# Patient Record
Sex: MALE | Race: WHITE | NOT HISPANIC OR LATINO | Employment: FULL TIME | URBAN - METROPOLITAN AREA
[De-identification: names, ages, dates, MRNs, and addresses within clinical notes are randomized per-mention and may not be internally consistent; named-entity substitution may affect disease eponyms.]

---

## 2017-07-21 ENCOUNTER — HOSPITAL ENCOUNTER (OUTPATIENT)
Dept: OTHER | Facility: HOSPITAL | Age: 32
Discharge: HOME | End: 2017-07-21
Attending: PREVENTIVE MEDICINE | Admitting: PREVENTIVE MEDICINE

## 2019-08-09 ENCOUNTER — APPOINTMENT (OUTPATIENT)
Dept: GENERAL RADIOLOGY | Facility: HOSPITAL | Age: 34
End: 2019-08-09

## 2019-08-09 ENCOUNTER — HOSPITAL ENCOUNTER (EMERGENCY)
Facility: HOSPITAL | Age: 34
Discharge: HOME | End: 2019-08-09
Admitting: EMERGENCY MEDICINE

## 2019-08-09 VITALS
DIASTOLIC BLOOD PRESSURE: 90 MMHG | WEIGHT: 228.18 LBS | OXYGEN SATURATION: 99 % | HEART RATE: 84 BPM | HEIGHT: 72 IN | RESPIRATION RATE: 16 BRPM | BODY MASS INDEX: 30.91 KG/M2 | TEMPERATURE: 98 F | SYSTOLIC BLOOD PRESSURE: 145 MMHG

## 2019-08-09 DIAGNOSIS — L02.415 ABSCESS OF RIGHT LOWER EXTREMITY: Primary | ICD-10-CM

## 2019-08-09 PROCEDURE — 73502 X-RAY EXAM HIP UNI 2-3 VIEWS: CPT

## 2019-08-09 PROCEDURE — 87205 SMEAR GRAM STAIN: CPT | Performed by: PHYSICIAN ASSISTANT

## 2019-08-09 PROCEDURE — 87147 CULTURE TYPE IMMUNOLOGIC: CPT | Performed by: PHYSICIAN ASSISTANT

## 2019-08-09 PROCEDURE — 99283 EMERGENCY DEPT VISIT LOW MDM: CPT

## 2019-08-09 PROCEDURE — 87070 CULTURE OTHR SPECIMN AEROBIC: CPT | Performed by: PHYSICIAN ASSISTANT

## 2019-08-09 PROCEDURE — 87186 SC STD MICRODIL/AGAR DIL: CPT | Performed by: PHYSICIAN ASSISTANT

## 2019-08-09 RX ORDER — IBUPROFEN 400 MG/1
800 TABLET ORAL ONCE
Status: COMPLETED | OUTPATIENT
Start: 2019-08-09 | End: 2019-08-09

## 2019-08-09 RX ORDER — SULFAMETHOXAZOLE AND TRIMETHOPRIM 800; 160 MG/1; MG/1
1 TABLET ORAL 2 TIMES DAILY
Qty: 20 TABLET | Refills: 0 | Status: SHIPPED | OUTPATIENT
Start: 2019-08-09

## 2019-08-09 RX ORDER — CEPHALEXIN 500 MG/1
500 CAPSULE ORAL 3 TIMES DAILY
Qty: 30 CAPSULE | Refills: 0 | Status: SHIPPED | OUTPATIENT
Start: 2019-08-09

## 2019-08-09 RX ORDER — IBUPROFEN 800 MG/1
800 TABLET ORAL EVERY 6 HOURS PRN
Qty: 30 TABLET | Refills: 0 | Status: SHIPPED | OUTPATIENT
Start: 2019-08-09

## 2019-08-09 RX ADMIN — IBUPROFEN 800 MG: 400 TABLET ORAL at 19:24

## 2019-08-09 NOTE — ED PROVIDER NOTES
Subjective   History of Present Illness  Patient is a healthy 34-year-old male who presents with an abscess on his right hip that he noticed 2 weeks ago.  Patient states his been putting antibiotic ointment on it with no relief of the symptoms.  He describes localized tenderness with palpation.  He also reports some purulent drainage over the past 2 days.  He denies fever, body aches or chills, nausea or vomiting, chest pain, shortness of breath  Review of Systems   Constitutional: Negative.    Respiratory: Negative.    Cardiovascular: Negative.    Gastrointestinal: Negative for abdominal pain, nausea and vomiting.   Musculoskeletal: Negative for gait problem and joint swelling.   Skin: Positive for wound.   Neurological: Negative for dizziness, seizures, weakness and headaches.       No past medical history on file.    No Known Allergies    No past surgical history on file.    No family history on file.    Social History     Socioeconomic History   • Marital status: Significant Other     Spouse name: Not on file   • Number of children: Not on file   • Years of education: Not on file   • Highest education level: Not on file           Objective   Physical Exam   Nursing note and vitals reviewed.    The patient is well developed, well nourished, alert, cooperative and in no acute distress.    HEENT exam is normocephalic atraumatic. Pupils are equal round and reactive to light. EOMI.  Mucous membranes are moist.     Lungs are clear to auscultation bilaterally chest wall expansion equal without retraction    Cardiovascular:  Regular rate and rhythm without murmur     Extremities:  There is no significant deformity or joint abnormality.  No edema.  Peripheral pulses are intact.  Normal range of motion of right hip.     Neurologic:  Alert and oriented without focal neurological deficits.     Skin shows 3 cm x 2 cm purulent abscess with surrounding induration and erythema noted.  No fluctuance noted. Otherwise skin shows  "no rash, petechiae, or purpura.      Incision & Drainage  Date/Time: 8/9/2019 7:20 PM  Performed by: Erickson Polanco PA  Authorized by: Erickson Polanco PA     Consent:     Consent obtained:  Verbal    Consent given by:  Patient    Risks discussed:  Incomplete drainage, bleeding, pain and infection    Alternatives discussed:  No treatment and delayed treatment  Location:     Type:  Abscess    Size:  [id number]     Location:  Lower extremity    Lower extremity location:  Leg    Leg location:  R upper leg  Pre-procedure details:     Skin preparation:  Betadine  Anesthesia (see MAR for exact dosages):     Anesthesia method:  Local infiltration    Local anesthetic:  Lidocaine 2% w/o epi  Procedure type:     Complexity:  Complex  Procedure details:     Needle aspiration: no      Incision types:  Stab incision    Incision depth:  Subcutaneous    Scalpel blade:  11    Wound management:  Probed and deloculated    Drainage:  Purulent and bloody    Drainage amount:  Copious    Packing materials:  1/2 in iodoform gauze  Post-procedure details:     Patient tolerance of procedure:  Tolerated well, no immediate complications               ED Course    /83 (BP Location: Right arm, Patient Position: Sitting)   Pulse 93   Temp 98.4 °F (36.9 °C) (Oral)   Resp 19   Ht 182.9 cm (72\")   Wt 104 kg (228 lb 2.8 oz)   SpO2 98%   BMI 30.95 kg/m²   Medications   ibuprofen (ADVIL,MOTRIN) tablet 800 mg (not administered)     Xr Hip With Or Without Pelvis 2 - 3 View Right    Result Date: 8/9/2019  Normal exam.  Electronically Signed By-Jessie Pederson On:8/9/2019 6:11 PM This report was finalized on [id number] by  Jessie Pederson, .    Labs Reviewed   WOUND CULTURE                    MDM  Number of Diagnoses or Management Options  Abscess of right lower extremity:   Diagnosis management comments: Chart Review:  Comorbidity: None  Differentials: Cellulitis, abscess, insect bite, osteomyelitis     ;this list is not all inclusive " and does not constitute the entirety of considered causes  Labs: Culture pending  Imaging: Was interpreted by physician and reviewed by myself:  Xr Hip With Or Without Pelvis 2 - 3 View Right  Result Date: 8/9/2019  Normal exam.  Electronically Signed By-Jessie Pederson On:8/9/2019 6:11 PM This report was finalized on [id number] by  Jessie Pederson, .    Disposition/Treatment:  While in the ED above procedure was performed and patient tolerated well.  Patient stated he did not want narcotic pain medication for home he was given ibuprofen while in the ED and for home.  X-ray of hip was unremarkable.  There are no signs of osseous involvement. patient was given Bactrim and Keflex for home.  lab results and findings were discussed with the patient who voiced understanding of discharge instructions along with signs and symptoms requiring return to the ED.  Upon discharged patient was in stable condition with followup for a revaluation.        Amount and/or Complexity of Data Reviewed  Clinical lab tests: reviewed  Tests in the radiology section of CPT®: reviewed          Final diagnoses:   Abscess of right lower extremity            Erickson Polanco PA  08/09/19 1922       Erickson Polanco PA  08/09/19 1929

## 2019-08-09 NOTE — DISCHARGE INSTRUCTIONS
Take antibiotics as prescribed.    Follow-up with your primary care provider in 3-5 days.  If you do not have a primary care provider call [phone] SOURCE for help in finding one, or you may follow up with [facility] at [phone].    To ED for any new or worsening symptoms eating increased pain, increased redness, drainage    Take Tylenol or ibuprofen as needed for pain.    Follow about pending culture/labs through medical records or patient portal, please call hospital at [phone] for further information, asked for help information Management

## 2019-08-12 LAB
BACTERIA SPEC AEROBE CULT: ABNORMAL
GRAM STN SPEC: ABNORMAL
GRAM STN SPEC: ABNORMAL

## 2019-08-12 NOTE — PROGRESS NOTES
8/9 Wound Cx growing MRSA; I&D and Bactrim/Keflex given at ED d/c.  MRSA susceptible to Bactrim. No further ED f/u needed.  Bismark Kebede, PharmD

## 2021-06-16 ENCOUNTER — HOSPITAL ENCOUNTER (EMERGENCY)
Facility: HOSPITAL | Age: 36
Discharge: HOME | End: 2021-06-16
Attending: EMERGENCY MEDICINE | Admitting: EMERGENCY MEDICINE

## 2021-06-16 VITALS
SYSTOLIC BLOOD PRESSURE: 137 MMHG | WEIGHT: 197.09 LBS | RESPIRATION RATE: 20 BRPM | BODY MASS INDEX: 26.7 KG/M2 | OXYGEN SATURATION: 97 % | HEIGHT: 72 IN | DIASTOLIC BLOOD PRESSURE: 86 MMHG | HEART RATE: 68 BPM | TEMPERATURE: 98.2 F

## 2021-06-16 DIAGNOSIS — K40.30 INCARCERATED LEFT INGUINAL HERNIA: Primary | ICD-10-CM

## 2021-06-16 PROCEDURE — 99283 EMERGENCY DEPT VISIT LOW MDM: CPT

## 2021-06-16 PROCEDURE — 99284 EMERGENCY DEPT VISIT MOD MDM: CPT

## 2021-06-16 RX ORDER — SODIUM CHLORIDE 0.9 % (FLUSH) 0.9 %
10 SYRINGE (ML) INJECTION AS NEEDED
Status: DISCONTINUED | OUTPATIENT
Start: 2021-06-16 | End: 2021-06-16 | Stop reason: HOSPADM

## 2021-06-16 RX ORDER — ETOMIDATE 2 MG/ML
INJECTION INTRAVENOUS
Status: COMPLETED
Start: 2021-06-16 | End: 2021-06-16

## 2021-06-16 RX ADMIN — ETOMIDATE 40 MG: 40 INJECTION, SOLUTION INTRAVENOUS at 14:00

## 2021-06-16 NOTE — DISCHARGE INSTRUCTIONS
Support abdominal wall when coughing or sneezing.  Avoid heavy lifting.  Follow-up with general surgery to discuss surgical repair of hernia.  Return for increased pain, increased swelling, vomiting or any other concerns

## 2021-06-16 NOTE — ED NOTES
Pt c/o left groin pain; states Dx hernia at [facility] 1 day ago.     Mary Pinzon RN  06/16/21 4909

## 2021-06-16 NOTE — ED PROVIDER NOTES
Subjective   History of Present Illness  Groin pain  36-year-old male complains of pain swelling left groin from a hernia he has had for over a year but he states over the last 1 month he has had some increased pain and swelling and increased difficulty reducing the hernia.  He states he has had occasional nausea vomiting.  He states he was at an outlying facility yesterday and referred to general surgery.  He reports no fevers or chills  Review of Systems   Constitutional: Negative.    HENT: Negative.    Eyes: Negative.    Respiratory: Negative.    Cardiovascular: Negative.    Gastrointestinal: Positive for abdominal pain and vomiting. Negative for blood in stool and constipation.   Genitourinary: Negative.    Musculoskeletal: Negative.    Skin: Negative.    Neurological: Negative.    Psychiatric/Behavioral: Negative.        Past Medical History:   Diagnosis Date   • Anxiety    • Depression    • Hypertension    • Inguinal hernia        Allergies   Allergen Reactions   • Tramadol Nausea And Vomiting       Past Surgical History:   Procedure Laterality Date   • ADENOIDECTOMY     • FRACTURE SURGERY     • TONSILLECTOMY         History reviewed. No pertinent family history.    Social History     Socioeconomic History   • Marital status: Significant Other     Spouse name: Not on file   • Number of children: Not on file   • Years of education: Not on file   • Highest education level: Not on file   Tobacco Use   • Smoking status: Current Every Day Smoker   Substance and Sexual Activity   • Alcohol use: Yes   • Drug use: Not Currently   • Sexual activity: Defer       Prior to Admission medications    Medication Sig Start Date End Date Taking? Authorizing Provider   cephalexin (KEFLEX) 500 MG capsule Take 1 capsule by mouth 3 (Three) Times a Day. 8/9/19   Erickson Polanco PA   ibuprofen (ADVIL,MOTRIN) 800 MG tablet Take 1 tablet by mouth Every 6 (Six) Hours As Needed for Mild Pain , Moderate Pain  or Fever for up to 30  "doses. 8/9/19   Erickson Polanco PA   sulfamethoxazole-trimethoprim (BACTRIM DS,SEPTRA DS) 800-160 MG per tablet Take 1 tablet by mouth 2 (Two) Times a Day. 8/9/19   Erickson Polanco PA     /86   Pulse 91   Temp 98.2 °F (36.8 °C) (Oral)   Resp 16   Ht 182.9 cm (72\")   Wt 89.4 kg (197 lb 1.5 oz)   SpO2 100%   BMI 26.73 kg/m²   I examined the patient using the appropriate personal protective equipment.        Objective   Physical Exam  General: Well-developed well-appearing, somewhat anxious but otherwise appropriate  Eyes:  sclera nonicteric  HEENT: Mucous membranes moist, no mucosal swelling  Neck: Supple, no nuchal rigidity, no lymphadenopathy  Respirations: Respirations nonlabored, equal breath sounds bilaterally, clear lungs  Heart regular rate and rhythm, no murmurs rubs or gallops,   Abdomen soft nontender nondistended, no hepatosplenomegaly, there is a left inguinal protrusion, he is somewhat tender there is no erythema it does not track into the scrotum, no audible bowel sounds within the protrusion  Extremities no clubbing cyanosis or edema, calves are symmetric and nontender  Neuro cranial nerves grossly intact, no focal limb deficits  Psych oriented, cooperative  Skin no rash, brisk cap refill  Procedures           ED Course      Patient is agreeable to some light conscious sedation and hernia reduction attempt.  He was given IV etomidate 10 mg IV.  He was laid supine and with some steady direct pressure on the left inguinal hernia was reduced without difficulty.  He woke up without any complications was stable throughout the procedure.                                     MDM  Patient presents with incarcerated left inguinal hernia this was reduced during the emergency room course.  He is discharged to follow-up with general surgery for consideration of surgical repair.  He was given warning signs for return and is agreeable to the plan.  Final diagnoses:   Incarcerated left inguinal " hernia       ED Disposition  ED Disposition     ED Disposition Condition Comment    Discharge Stable           Daniel Reeder MD  [address]  [place] [facility]  [phone]    Schedule an appointment as soon as possible for a visit in 1 day           Medication List      No changes were made to your prescriptions during this visit.          Nathaniel Childers MD  06/16/21 7139

## 2021-06-16 NOTE — ED NOTES
Patient has hernia left groin for several months. Seen at [facility] in [place] in yesterday and he reports the doctor pushed so hard he had to threaten him to stop.      Melvi Arias RN  06/16/21 2621

## 2021-06-17 ENCOUNTER — TELEPHONE (OUTPATIENT)
Dept: SURGERY | Facility: CLINIC | Age: 36
End: 2021-06-17

## 2021-06-17 ENCOUNTER — PREP FOR SURGERY (OUTPATIENT)
Dept: OTHER | Facility: HOSPITAL | Age: 36
End: 2021-06-17

## 2021-06-17 ENCOUNTER — OFFICE VISIT (OUTPATIENT)
Dept: SURGERY | Facility: CLINIC | Age: 36
End: 2021-06-17

## 2021-06-17 VITALS
HEART RATE: 120 BPM | HEIGHT: 72 IN | BODY MASS INDEX: 26.14 KG/M2 | OXYGEN SATURATION: 97 % | SYSTOLIC BLOOD PRESSURE: 84 MMHG | WEIGHT: 193 LBS | DIASTOLIC BLOOD PRESSURE: 61 MMHG | TEMPERATURE: 98.7 F

## 2021-06-17 DIAGNOSIS — K40.30 INCARCERATED LEFT INGUINAL HERNIA: Primary | ICD-10-CM

## 2021-06-17 DIAGNOSIS — K40.30 UNILATERAL INGUINAL HERNIA WITH OBSTRUCTION AND WITHOUT GANGRENE, RECURRENCE NOT SPECIFIED: Primary | ICD-10-CM

## 2021-06-17 PROCEDURE — 99204 OFFICE O/P NEW MOD 45 MIN: CPT | Performed by: SURGERY

## 2021-06-17 RX ORDER — TRAMADOL HYDROCHLORIDE 50 MG/1
50 TABLET ORAL EVERY 6 HOURS PRN
Qty: 20 TABLET | Refills: 0 | Status: SHIPPED | OUTPATIENT
Start: 2021-06-17 | End: 2022-06-17

## 2021-06-17 RX ORDER — SODIUM CHLORIDE 9 MG/ML
100 INJECTION, SOLUTION INTRAVENOUS CONTINUOUS
Status: CANCELLED | OUTPATIENT
Start: 2021-06-17

## 2021-06-17 NOTE — H&P (VIEW-ONLY)
Subjective   Ga Dye is a 36 y.o. male.     History of present illness  Ga is a pleasant 36-year-old seen in the office today at request of the folks in the ER for a left inguinal hernia that pops in and out.  He has been seen several different local ERs with an incarcerated incarceration.  He has had the hernia for several years.  He just been putting off having it repaired and at this point is ready to proceed with repair.    He denies any change in his bladder or bowel habit associated with it.  No fevers chills no nausea or vomiting.    Past Medical History:   Diagnosis Date   • Anxiety    • Depression    • Hypertension    • Inguinal hernia        Past Surgical History:   Procedure Laterality Date   • ADENOIDECTOMY     • FRACTURE SURGERY     • LEG SURGERY     • TONSILLECTOMY         Outpatient Encounter Medications as of 6/17/2021   Medication Sig Dispense Refill   • ibuprofen (ADVIL,MOTRIN) 800 MG tablet Take 1 tablet by mouth Every 6 (Six) Hours As Needed for Mild Pain , Moderate Pain  or Fever for up to 30 doses. 30 tablet 0   • cephalexin (KEFLEX) 500 MG capsule Take 1 capsule by mouth 3 (Three) Times a Day. 30 capsule 0   • sulfamethoxazole-trimethoprim (BACTRIM DS,SEPTRA DS) 800-160 MG per tablet Take 1 tablet by mouth 2 (Two) Times a Day. 20 tablet 0     Facility-Administered Encounter Medications as of 6/17/2021   Medication Dose Route Frequency Provider Last Rate Last Admin   • [COMPLETED] etomidate (AMIDATE) 2 MG/ML injection  - ADS Override Pull        40 mg at 06/16/21 1400   • [DISCONTINUED] sodium chloride 0.9 % flush 10 mL  10 mL Intravenous PRN Nathaniel Childers MD           Allergies   Allergen Reactions   • Tramadol Nausea And Vomiting       History reviewed. No pertinent family history.    Social History     Socioeconomic History   • Marital status: Significant Other     Spouse name: Not on file   • Number of children: Not on file   • Years of education: Not on file   • Highest  education level: Not on file   Tobacco Use   • Smoking status: Current Every Day Smoker   Substance and Sexual Activity   • Alcohol use: Yes   • Drug use: Not Currently   • Sexual activity: Defer       The following portions of the patient's history were reviewed and updated as appropriate: allergies, current medications, past family history, past medical history, past social history, past surgical history and problem list.    Objective     Complete review of systems is done and unremarkable with exception the chief complaint    Physical exam shows a pleasant 36-year-old male.  He is a little bit histrionic.  HEENT is negative.  Heart regular.  Lungs are clear.  Abdomen is soft.  The left inguinal hernia is easily reducible.  Extremities with equal range of motion and usage neuro with no obvious focal deficit.    Impression: Reducible left inguinal hernia    Recommendation: Laparoscopic left inguinal hernia repair.  We will add him to the OR schedule for Monday.  Assessment/Plan   There are no diagnoses linked to this encounter.               Jason Champion DO  6/17/2021  13:38 EDT

## 2021-06-17 NOTE — TELEPHONE ENCOUNTER
Spoke with pt father to let him know the only pain medication we can give is 800mg Ibuprofen every 6hrs kp      Pt girlfriend came in our office and said pt needed something for pain, explained to her that the only thing Dr. Champion will give him is 800mg Ibuprofen and for him to go home and rest.  kp

## 2021-06-18 ENCOUNTER — HOSPITAL ENCOUNTER (OUTPATIENT)
Dept: CARDIOLOGY | Facility: HOSPITAL | Age: 36
Discharge: HOME | End: 2021-06-18

## 2021-06-18 ENCOUNTER — LAB (OUTPATIENT)
Dept: LAB | Facility: HOSPITAL | Age: 36
End: 2021-06-18

## 2021-06-18 DIAGNOSIS — K40.30 INCARCERATED LEFT INGUINAL HERNIA: ICD-10-CM

## 2021-06-18 LAB
ALBUMIN SERPL-MCNC: 4.8 G/DL (ref 3.5–5.2)
ALBUMIN/GLOB SERPL: 1.8 G/DL
ALP SERPL-CCNC: 59 U/L (ref 39–117)
ALT SERPL W P-5'-P-CCNC: 20 U/L (ref 1–41)
ANION GAP SERPL CALCULATED.3IONS-SCNC: 13.7 MMOL/L (ref 5–15)
AST SERPL-CCNC: 42 U/L (ref 1–40)
BASOPHILS # BLD AUTO: 0.02 10*3/MM3 (ref 0–0.2)
BASOPHILS NFR BLD AUTO: 0.3 % (ref 0–1.5)
BILIRUB SERPL-MCNC: 1.2 MG/DL (ref 0–1.2)
BUN SERPL-MCNC: 13 MG/DL (ref 6–20)
BUN/CREAT SERPL: 15.1 (ref 7–25)
CALCIUM SPEC-SCNC: 9.5 MG/DL (ref 8.6–10.5)
CHLORIDE SERPL-SCNC: 95 MMOL/L (ref 98–107)
CO2 SERPL-SCNC: 23.3 MMOL/L (ref 22–29)
CREAT SERPL-MCNC: 0.86 MG/DL (ref 0.76–1.27)
DEPRECATED RDW RBC AUTO: 41.7 FL (ref 37–54)
EOSINOPHIL # BLD AUTO: 0.03 10*3/MM3 (ref 0–0.4)
EOSINOPHIL NFR BLD AUTO: 0.4 % (ref 0.3–6.2)
ERYTHROCYTE [DISTWIDTH] IN BLOOD BY AUTOMATED COUNT: 13.4 % (ref 12.3–15.4)
GFR SERPL CREATININE-BSD FRML MDRD: 100 ML/MIN/1.73
GLOBULIN UR ELPH-MCNC: 2.6 GM/DL
GLUCOSE SERPL-MCNC: 73 MG/DL (ref 65–99)
HCT VFR BLD AUTO: 47.1 % (ref 37.5–51)
HGB BLD-MCNC: 15.8 G/DL (ref 13–17.7)
IMM GRANULOCYTES # BLD AUTO: 0.02 10*3/MM3 (ref 0–0.05)
IMM GRANULOCYTES NFR BLD AUTO: 0.3 % (ref 0–0.5)
LYMPHOCYTES # BLD AUTO: 1.22 10*3/MM3 (ref 0.7–3.1)
LYMPHOCYTES NFR BLD AUTO: 15.7 % (ref 19.6–45.3)
MCH RBC QN AUTO: 28.7 PG (ref 26.6–33)
MCHC RBC AUTO-ENTMCNC: 33.5 G/DL (ref 31.5–35.7)
MCV RBC AUTO: 85.5 FL (ref 79–97)
MONOCYTES # BLD AUTO: 0.7 10*3/MM3 (ref 0.1–0.9)
MONOCYTES NFR BLD AUTO: 9 % (ref 5–12)
NEUTROPHILS NFR BLD AUTO: 5.78 10*3/MM3 (ref 1.7–7)
NEUTROPHILS NFR BLD AUTO: 74.3 % (ref 42.7–76)
NRBC BLD AUTO-RTO: 0 /100 WBC (ref 0–0.2)
PLATELET # BLD AUTO: 288 10*3/MM3 (ref 140–450)
PMV BLD AUTO: 11.4 FL (ref 6–12)
POTASSIUM SERPL-SCNC: 4.6 MMOL/L (ref 3.5–5.2)
PROT SERPL-MCNC: 7.4 G/DL (ref 6–8.5)
QT INTERVAL: 347 MS
RBC # BLD AUTO: 5.51 10*6/MM3 (ref 4.14–5.8)
SODIUM SERPL-SCNC: 132 MMOL/L (ref 136–145)
WBC # BLD AUTO: 7.77 10*3/MM3 (ref 3.4–10.8)

## 2021-06-18 PROCEDURE — U0004 COV-19 TEST NON-CDC HGH THRU: HCPCS

## 2021-06-18 PROCEDURE — 93010 ELECTROCARDIOGRAM REPORT: CPT | Performed by: INTERNAL MEDICINE

## 2021-06-18 PROCEDURE — 80053 COMPREHEN METABOLIC PANEL: CPT

## 2021-06-18 PROCEDURE — 93005 ELECTROCARDIOGRAM TRACING: CPT | Performed by: SURGERY

## 2021-06-18 PROCEDURE — C9803 HOPD COVID-19 SPEC COLLECT: HCPCS

## 2021-06-18 PROCEDURE — 85025 COMPLETE CBC W/AUTO DIFF WBC: CPT

## 2021-06-18 PROCEDURE — 36415 COLL VENOUS BLD VENIPUNCTURE: CPT

## 2021-06-19 LAB — SARS-COV-2 ORF1AB RESP QL NAA+PROBE: NOT DETECTED

## 2021-06-21 ENCOUNTER — ANESTHESIA (OUTPATIENT)
Dept: PERIOP | Facility: HOSPITAL | Age: 36
End: 2021-06-21

## 2021-06-21 ENCOUNTER — ANESTHESIA EVENT (OUTPATIENT)
Dept: PERIOP | Facility: HOSPITAL | Age: 36
End: 2021-06-21

## 2021-06-21 ENCOUNTER — HOSPITAL ENCOUNTER (OUTPATIENT)
Facility: HOSPITAL | Age: 36
Setting detail: HOSPITAL OUTPATIENT SURGERY
Discharge: HOME | End: 2021-06-21
Attending: SURGERY | Admitting: SURGERY

## 2021-06-21 VITALS
BODY MASS INDEX: 25.2 KG/M2 | HEIGHT: 72 IN | DIASTOLIC BLOOD PRESSURE: 87 MMHG | OXYGEN SATURATION: 95 % | TEMPERATURE: 98.6 F | SYSTOLIC BLOOD PRESSURE: 141 MMHG | HEART RATE: 83 BPM | RESPIRATION RATE: 16 BRPM | WEIGHT: 186.07 LBS

## 2021-06-21 DIAGNOSIS — K40.30 INCARCERATED LEFT INGUINAL HERNIA: ICD-10-CM

## 2021-06-21 PROCEDURE — 25010000002 MIDAZOLAM PER 1 MG: Performed by: NURSE ANESTHETIST, CERTIFIED REGISTERED

## 2021-06-21 PROCEDURE — 25010000002 DIPHENHYDRAMINE PER 50 MG: Performed by: NURSE ANESTHETIST, CERTIFIED REGISTERED

## 2021-06-21 PROCEDURE — 25010000002 ONDANSETRON PER 1 MG: Performed by: NURSE ANESTHETIST, CERTIFIED REGISTERED

## 2021-06-21 PROCEDURE — 25010000002 FENTANYL CITRATE (PF) 100 MCG/2ML SOLUTION: Performed by: NURSE ANESTHETIST, CERTIFIED REGISTERED

## 2021-06-21 PROCEDURE — 25010000002 HYDROMORPHONE PER 4 MG: Performed by: NURSE ANESTHETIST, CERTIFIED REGISTERED

## 2021-06-21 PROCEDURE — 25010000002 NEOSTIGMINE 5 MG/5ML SOLUTION: Performed by: NURSE ANESTHETIST, CERTIFIED REGISTERED

## 2021-06-21 PROCEDURE — C1781 MESH (IMPLANTABLE): HCPCS | Performed by: SURGERY

## 2021-06-21 PROCEDURE — 49651 LAP ING HERNIA REPAIR RECUR: CPT | Performed by: REGISTERED NURSE

## 2021-06-21 PROCEDURE — 25010000002 DEXAMETHASONE PER 1 MG: Performed by: NURSE ANESTHETIST, CERTIFIED REGISTERED

## 2021-06-21 PROCEDURE — 25010000002 PROPOFOL 10 MG/ML EMULSION: Performed by: NURSE ANESTHETIST, CERTIFIED REGISTERED

## 2021-06-21 PROCEDURE — 49651 LAP ING HERNIA REPAIR RECUR: CPT | Performed by: SURGERY

## 2021-06-21 DEVICE — BARD 3DMAX MESH LEFT LARGE
Type: IMPLANTABLE DEVICE | Site: ABDOMEN | Status: FUNCTIONAL
Brand: BARD 3DMAX MESH

## 2021-06-21 RX ORDER — MIDAZOLAM HYDROCHLORIDE 1 MG/ML
INJECTION INTRAMUSCULAR; INTRAVENOUS AS NEEDED
Status: DISCONTINUED | OUTPATIENT
Start: 2021-06-21 | End: 2021-06-21 | Stop reason: SURG

## 2021-06-21 RX ORDER — ONDANSETRON 2 MG/ML
INJECTION INTRAMUSCULAR; INTRAVENOUS AS NEEDED
Status: DISCONTINUED | OUTPATIENT
Start: 2021-06-21 | End: 2021-06-21 | Stop reason: SURG

## 2021-06-21 RX ORDER — LIDOCAINE HYDROCHLORIDE 20 MG/ML
INJECTION, SOLUTION EPIDURAL; INFILTRATION; INTRACAUDAL; PERINEURAL AS NEEDED
Status: DISCONTINUED | OUTPATIENT
Start: 2021-06-21 | End: 2021-06-21 | Stop reason: SURG

## 2021-06-21 RX ORDER — BUPIVACAINE HYDROCHLORIDE 2.5 MG/ML
INJECTION, SOLUTION EPIDURAL; INFILTRATION; INTRACAUDAL AS NEEDED
Status: DISCONTINUED | OUTPATIENT
Start: 2021-06-21 | End: 2021-06-21 | Stop reason: HOSPADM

## 2021-06-21 RX ORDER — SODIUM CHLORIDE 0.9 % (FLUSH) 0.9 %
10 SYRINGE (ML) INJECTION EVERY 12 HOURS SCHEDULED
Status: DISCONTINUED | OUTPATIENT
Start: 2021-06-21 | End: 2021-06-21 | Stop reason: HOSPADM

## 2021-06-21 RX ORDER — GLYCOPYRROLATE 1 MG/5 ML
SYRINGE (ML) INTRAVENOUS AS NEEDED
Status: DISCONTINUED | OUTPATIENT
Start: 2021-06-21 | End: 2021-06-21 | Stop reason: SURG

## 2021-06-21 RX ORDER — OXYCODONE AND ACETAMINOPHEN 10; 325 MG/1; MG/1
1 TABLET ORAL EVERY 4 HOURS PRN
Qty: 40 TABLET | Refills: 0 | Status: SHIPPED | OUTPATIENT
Start: 2021-06-21 | End: 2021-06-28 | Stop reason: SDUPTHER

## 2021-06-21 RX ORDER — NEOSTIGMINE METHYLSULFATE 5 MG/5 ML
SYRINGE (ML) INTRAVENOUS AS NEEDED
Status: DISCONTINUED | OUTPATIENT
Start: 2021-06-21 | End: 2021-06-21 | Stop reason: SURG

## 2021-06-21 RX ORDER — DEXAMETHASONE SODIUM PHOSPHATE 4 MG/ML
INJECTION, SOLUTION INTRA-ARTICULAR; INTRALESIONAL; INTRAMUSCULAR; INTRAVENOUS; SOFT TISSUE AS NEEDED
Status: DISCONTINUED | OUTPATIENT
Start: 2021-06-21 | End: 2021-06-21 | Stop reason: SURG

## 2021-06-21 RX ORDER — ACETAMINOPHEN 325 MG/1
650 TABLET ORAL ONCE AS NEEDED
Status: DISCONTINUED | OUTPATIENT
Start: 2021-06-21 | End: 2021-06-21 | Stop reason: HOSPADM

## 2021-06-21 RX ORDER — PROMETHAZINE HYDROCHLORIDE 25 MG/1
25 SUPPOSITORY RECTAL ONCE AS NEEDED
Status: DISCONTINUED | OUTPATIENT
Start: 2021-06-21 | End: 2021-06-21 | Stop reason: HOSPADM

## 2021-06-21 RX ORDER — HYDROMORPHONE HCL 110MG/55ML
0.5 PATIENT CONTROLLED ANALGESIA SYRINGE INTRAVENOUS
Status: DISCONTINUED | OUTPATIENT
Start: 2021-06-21 | End: 2021-06-21 | Stop reason: HOSPADM

## 2021-06-21 RX ORDER — DEXAMETHASONE SODIUM PHOSPHATE 4 MG/ML
8 INJECTION, SOLUTION INTRA-ARTICULAR; INTRALESIONAL; INTRAMUSCULAR; INTRAVENOUS; SOFT TISSUE ONCE AS NEEDED
Status: DISCONTINUED | OUTPATIENT
Start: 2021-06-21 | End: 2021-06-21 | Stop reason: HOSPADM

## 2021-06-21 RX ORDER — MIDAZOLAM HYDROCHLORIDE 1 MG/ML
1 INJECTION INTRAMUSCULAR; INTRAVENOUS
Status: DISCONTINUED | OUTPATIENT
Start: 2021-06-21 | End: 2021-06-21 | Stop reason: HOSPADM

## 2021-06-21 RX ORDER — ROCURONIUM BROMIDE 10 MG/ML
INJECTION, SOLUTION INTRAVENOUS AS NEEDED
Status: DISCONTINUED | OUTPATIENT
Start: 2021-06-21 | End: 2021-06-21 | Stop reason: SURG

## 2021-06-21 RX ORDER — ONDANSETRON 2 MG/ML
4 INJECTION INTRAMUSCULAR; INTRAVENOUS ONCE AS NEEDED
Status: DISCONTINUED | OUTPATIENT
Start: 2021-06-21 | End: 2021-06-21 | Stop reason: HOSPADM

## 2021-06-21 RX ORDER — PROMETHAZINE HYDROCHLORIDE 25 MG/1
25 TABLET ORAL ONCE AS NEEDED
Status: DISCONTINUED | OUTPATIENT
Start: 2021-06-21 | End: 2021-06-21 | Stop reason: HOSPADM

## 2021-06-21 RX ORDER — FENTANYL CITRATE 50 UG/ML
INJECTION, SOLUTION INTRAMUSCULAR; INTRAVENOUS AS NEEDED
Status: DISCONTINUED | OUTPATIENT
Start: 2021-06-21 | End: 2021-06-21 | Stop reason: SURG

## 2021-06-21 RX ORDER — SODIUM CHLORIDE, SODIUM LACTATE, POTASSIUM CHLORIDE, CALCIUM CHLORIDE 600; 310; 30; 20 MG/100ML; MG/100ML; MG/100ML; MG/100ML
9 INJECTION, SOLUTION INTRAVENOUS CONTINUOUS PRN
Status: DISCONTINUED | OUTPATIENT
Start: 2021-06-21 | End: 2021-06-21 | Stop reason: HOSPADM

## 2021-06-21 RX ORDER — ACETAMINOPHEN 650 MG/1
650 SUPPOSITORY RECTAL ONCE AS NEEDED
Status: DISCONTINUED | OUTPATIENT
Start: 2021-06-21 | End: 2021-06-21 | Stop reason: HOSPADM

## 2021-06-21 RX ORDER — PROPOFOL 10 MG/ML
VIAL (ML) INTRAVENOUS AS NEEDED
Status: DISCONTINUED | OUTPATIENT
Start: 2021-06-21 | End: 2021-06-21 | Stop reason: SURG

## 2021-06-21 RX ORDER — HYDROMORPHONE HCL 110MG/55ML
PATIENT CONTROLLED ANALGESIA SYRINGE INTRAVENOUS AS NEEDED
Status: DISCONTINUED | OUTPATIENT
Start: 2021-06-21 | End: 2021-06-21 | Stop reason: SURG

## 2021-06-21 RX ORDER — SODIUM CHLORIDE 9 MG/ML
100 INJECTION, SOLUTION INTRAVENOUS CONTINUOUS
Status: DISCONTINUED | OUTPATIENT
Start: 2021-06-21 | End: 2021-06-21 | Stop reason: HOSPADM

## 2021-06-21 RX ORDER — METOCLOPRAMIDE HYDROCHLORIDE 5 MG/ML
10 INJECTION INTRAMUSCULAR; INTRAVENOUS ONCE AS NEEDED
Status: DISCONTINUED | OUTPATIENT
Start: 2021-06-21 | End: 2021-06-21 | Stop reason: HOSPADM

## 2021-06-21 RX ORDER — OXYCODONE HYDROCHLORIDE 5 MG/1
10 TABLET ORAL ONCE AS NEEDED
Status: DISCONTINUED | OUTPATIENT
Start: 2021-06-21 | End: 2021-06-21 | Stop reason: HOSPADM

## 2021-06-21 RX ORDER — MEPERIDINE HYDROCHLORIDE 25 MG/ML
12.5 INJECTION INTRAMUSCULAR; INTRAVENOUS; SUBCUTANEOUS
Status: DISCONTINUED | OUTPATIENT
Start: 2021-06-21 | End: 2021-06-21 | Stop reason: HOSPADM

## 2021-06-21 RX ORDER — DIPHENHYDRAMINE HYDROCHLORIDE 50 MG/ML
INJECTION INTRAMUSCULAR; INTRAVENOUS AS NEEDED
Status: DISCONTINUED | OUTPATIENT
Start: 2021-06-21 | End: 2021-06-21 | Stop reason: SURG

## 2021-06-21 RX ORDER — SODIUM CHLORIDE 0.9 % (FLUSH) 0.9 %
10 SYRINGE (ML) INJECTION AS NEEDED
Status: DISCONTINUED | OUTPATIENT
Start: 2021-06-21 | End: 2021-06-21 | Stop reason: HOSPADM

## 2021-06-21 RX ORDER — OXYCODONE HYDROCHLORIDE 5 MG/1
10 TABLET ORAL ONCE
Status: COMPLETED | OUTPATIENT
Start: 2021-06-21 | End: 2021-06-21

## 2021-06-21 RX ADMIN — HYDROMORPHONE HYDROCHLORIDE 1 MG: 2 INJECTION INTRAMUSCULAR; INTRAVENOUS; SUBCUTANEOUS at 12:58

## 2021-06-21 RX ADMIN — HYDROMORPHONE HYDROCHLORIDE 1 MG: 2 INJECTION INTRAMUSCULAR; INTRAVENOUS; SUBCUTANEOUS at 12:53

## 2021-06-21 RX ADMIN — MIDAZOLAM 2 MG: 1 INJECTION INTRAMUSCULAR; INTRAVENOUS at 12:33

## 2021-06-21 RX ADMIN — Medication 4 MG: at 13:17

## 2021-06-21 RX ADMIN — DIPHENHYDRAMINE HYDROCHLORIDE 12.5 MG: 50 INJECTION, SOLUTION INTRAMUSCULAR; INTRAVENOUS at 12:50

## 2021-06-21 RX ADMIN — HYDROMORPHONE HYDROCHLORIDE 0.5 MG: 2 INJECTION, SOLUTION INTRAMUSCULAR; INTRAVENOUS; SUBCUTANEOUS at 14:44

## 2021-06-21 RX ADMIN — LIDOCAINE HYDROCHLORIDE 100 MG: 20 INJECTION, SOLUTION EPIDURAL; INFILTRATION; INTRACAUDAL; PERINEURAL at 12:41

## 2021-06-21 RX ADMIN — ONDANSETRON 4 MG: 2 INJECTION INTRAMUSCULAR; INTRAVENOUS at 13:12

## 2021-06-21 RX ADMIN — HYDROMORPHONE HYDROCHLORIDE 0.5 MG: 2 INJECTION, SOLUTION INTRAMUSCULAR; INTRAVENOUS; SUBCUTANEOUS at 14:27

## 2021-06-21 RX ADMIN — OXYCODONE HYDROCHLORIDE 10 MG: 5 TABLET ORAL at 14:14

## 2021-06-21 RX ADMIN — PROPOFOL 200 MG: 10 INJECTION, EMULSION INTRAVENOUS at 12:41

## 2021-06-21 RX ADMIN — DEXAMETHASONE SODIUM PHOSPHATE 8 MG: 4 INJECTION, SOLUTION INTRAMUSCULAR; INTRAVENOUS at 12:45

## 2021-06-21 RX ADMIN — CEFAZOLIN SODIUM 2 G: 1 INJECTION, POWDER, FOR SOLUTION INTRAMUSCULAR; INTRAVENOUS at 12:40

## 2021-06-21 RX ADMIN — ROCURONIUM BROMIDE 40 MG: 10 INJECTION INTRAVENOUS at 12:41

## 2021-06-21 RX ADMIN — SODIUM CHLORIDE 100 ML/HR: 9 INJECTION, SOLUTION INTRAVENOUS at 09:38

## 2021-06-21 RX ADMIN — SUGAMMADEX 200 MG: 100 INJECTION, SOLUTION INTRAVENOUS at 13:27

## 2021-06-21 RX ADMIN — FENTANYL CITRATE 50 MCG: 50 INJECTION, SOLUTION INTRAMUSCULAR; INTRAVENOUS at 13:04

## 2021-06-21 RX ADMIN — Medication 0.8 MG: at 13:17

## 2021-06-21 RX ADMIN — FENTANYL CITRATE 100 MCG: 50 INJECTION, SOLUTION INTRAMUSCULAR; INTRAVENOUS at 12:33

## 2021-06-21 RX ADMIN — SODIUM CHLORIDE: 9 INJECTION, SOLUTION INTRAVENOUS at 13:10

## 2021-06-21 RX ADMIN — SODIUM CHLORIDE: 9 INJECTION, SOLUTION INTRAVENOUS at 12:30

## 2021-06-21 RX ADMIN — FENTANYL CITRATE 50 MCG: 50 INJECTION, SOLUTION INTRAMUSCULAR; INTRAVENOUS at 13:20

## 2021-06-21 RX ADMIN — ROCURONIUM BROMIDE 10 MG: 10 INJECTION INTRAVENOUS at 12:58

## 2021-06-21 NOTE — OP NOTE
INGUINAL HERNIA REPAIR LAPAROSCOPIC  Progress Note    Ga Dye  6/21/2021    Pre-op Diagnosis:   Incarcerated left inguinal hernia [K40.30]       Post-Op Diagnosis Codes:     * Incarcerated left inguinal hernia [K40.30]    Procedure/CPT® Codes:        Procedure(s):  INGUINAL HERNIA REPAIR LAPAROSCOPIC left    Surgeon(s):  Jason Champion DO    Anesthesia: General    Staff:   Circulator: Arleth Daniel RN  Scrub Person: Sumit Chow  Assistant: Leah Pineda RNFA  Assistant: Leah Pineda RNFA      Estimated Blood Loss: minimal    Urine Voided: * No values recorded between 6/21/2021 12:30 PM and 6/21/2021  1:13 PM *    Specimens:                None          Drains: * No LDAs found *    Findings: Left indirect inguinal hernia    Complications: None     Ga is a pleasant 36-year-old male seen in the office last Thursday with an incarcerated left inguinal hernia we were able to reduce at that time.  Consequently we scheduled him for a laparoscopic repair today.  We discussed the procedure and risks in detail.  He understands those accepts those and is for that reason presents.    Patient was taken the operating room placed in the supine position.  General was done by Melvi Gu CRNA    Timeout done identity verified.  Abdomen and scrotum all prepped and draped after 3-minute dry time.  An infraumbilical incision was made and blunt dissection carried down to the anterior fascia.  Anterior fascia is incised transversely and underlying rectus muscle identified.  Preperitoneal space was then developed just a bit with a large Jessie and a Spacemaker balloon is inserted into the space.  Its balloon is inflated to 40 pumps under direct vision.  The balloon was then removed leaving the operating port in position.  Its balloon is inflated to 10 cc and the preperitoneal space was insufflated with CO2 to 15 mmHg pressure.  Two 5 mm ports were then placed in the midline.  1 above the pubis [facility] between  the pubis and the umbilicus.  He is placed in Trendelenburg position and rolled to the right just a bit.  Using duckbill graspers then the left inguinal canal is dissected spermatic cord freed from its attachments and the true cord structures identified.  The indirect sac was then posteriorized nicely.  He also had a small direct defect and fat was removed from the small direct defect.  We chose a Bard 3D max size large mesh it was rolled and inserted into the space unrolled and held in the appropriate anatomic position.  CO2 was then allowed to escape to the atmosphere trapping the mesh between the 2 layers.  The fascia at the umbilicus was then approximated with 0 Vicryl stitch and then skin was closed throughout with 4-0 Vicryl in a subcu manner.  Sterile OpSite dressings were applied.  Mastisol and Steri-Strips were placed.  He tolerated the procedure well was awakened and transferred to recovery in satisfactory condition.  The final sponge instrument and needle counts were correct.    Assistant: Leah Pineda RNFA  was responsible for performing the following activities: Suturing, Closing, Placing Dressing and Held/Positioned Camera and their skilled assistance was necessary for the success of this case.    Jason Champion,      Date: 6/21/2021  Time: 13:18 EDT

## 2021-06-21 NOTE — ANESTHESIA PROCEDURE NOTES
Airway  Urgency: elective    Date/Time: 6/21/2021 12:45 PM  Airway not difficult    General Information and Staff    Patient location during procedure: OR  Anesthesiologist: Jason Tran MD  CRNA: Melvi Gu CRNA    Indications and Patient Condition  Indications for airway management: airway protection  Mask difficulty assessment: 1 - vent by mask    Final Airway Details  Final airway type: endotracheal airway      Successful airway: ETT  Cuffed: yes   Successful intubation technique: direct laryngoscopy  Facilitating devices/methods: intubating stylet  Endotracheal tube insertion site: oral  Blade: Aleja  Blade size: 4  ETT size (mm): 8.0  Cormack-Lehane Classification: grade I - full view of glottis  Placement verified by: chest auscultation, capnometry and palpation of cuff   Measured from: lips  ETT/EBT  to lips (cm): 22  Number of attempts at approach: 1  Assessment: lips, teeth, and gum same as pre-op and atraumatic intubation

## 2021-06-21 NOTE — ANESTHESIA POSTPROCEDURE EVALUATION
Patient: Ga Dye    Procedure Summary     Date: 06/21/21 Room / Location: [facility] OR 08 / [facility] MAIN OR    Anesthesia Start: 1230 Anesthesia Stop: 1332    Procedure: INGUINAL HERNIA REPAIR LAPAROSCOPIC (Left Abdomen) Diagnosis:       Incarcerated left inguinal hernia      (Incarcerated left inguinal hernia [K40.30])    Surgeons: Jason Champion DO Provider: Jason Tran MD    Anesthesia Type: general ASA Status: 2          Anesthesia Type: general    Vitals  Vitals Value Taken Time   /94 06/21/21 1459   Temp 98.8 °F (37.1 °C) 06/21/21 1458   Pulse 79 06/21/21 1459   Resp 15 06/21/21 1458   SpO2 95 % 06/21/21 1459   Vitals shown include unvalidated device data.        Post Anesthesia Care and Evaluation    Patient location during evaluation: PACU  Patient participation: complete - patient cannot participate  Level of consciousness: responsive to light touch, responsive to physical stimuli, awake and responsive to verbal stimuli  Pain score: 0  Pain management: adequate  Airway patency: patent  Anesthetic complications: No anesthetic complications  PONV Status: controlled  Cardiovascular status: acceptable and hemodynamically stable  Respiratory status: acceptable and face mask  Hydration status: acceptable    Comments: Satisfactory progress.Patient seen and examined postoperatively; vital signs stable; SpO2 greater than or equal to 90%; cardiopulmonary status stable; nausea/vomiting adequately controlled; pain adequately controlled; no apparent anesthesia complications; patient discharged from anesthesia care when discharge criteria were met

## 2021-06-21 NOTE — ANESTHESIA PREPROCEDURE EVALUATION
Anesthesia Evaluation     Patient summary reviewed and Nursing notes reviewed   NPO Solid Status: > 6 hours  NPO Liquid Status: > 6 hours           Airway   Mallampati: II  TM distance: >3 FB  Neck ROM: full  No difficulty expected  Dental      Pulmonary - negative pulmonary ROS and normal exam    breath sounds clear to auscultation  Cardiovascular - normal exam    Rhythm: regular  Rate: normal    (+) hypertension,       Neuro/Psych- negative ROS  GI/Hepatic/Renal/Endo - negative ROS     Musculoskeletal (-) negative ROS    Abdominal  - normal exam   Substance History - negative use     OB/GYN          Other        ROS/Med Hx Other: Uncomfortable several weeks                Anesthesia Plan    ASA 2     general     intravenous induction     Anesthetic plan, all risks, benefits, and alternatives have been provided, discussed and informed consent has been obtained with: patient.

## 2021-06-21 NOTE — ANESTHESIA POSTPROCEDURE EVALUATION
Patient: Ga Dye    Procedure Summary     Date: 06/21/21 Room / Location: [facility] OR 08 / [facility] MAIN OR    Anesthesia Start: 1230 Anesthesia Stop:     Procedure: INGUINAL HERNIA REPAIR LAPAROSCOPIC (Left Abdomen) Diagnosis:       Incarcerated left inguinal hernia      (Incarcerated left inguinal hernia [K40.30])    Surgeons: Jason Champion DO Provider: Jason Tran MD    Anesthesia Type: general ASA Status: 2          Anesthesia Type: general    Vitals  Vitals Value Taken Time   /91 06/21/21 1331   Temp     Pulse 94 06/21/21 1331   Resp     SpO2 95 % 06/21/21 1331   Vitals shown include unvalidated device data.        Post Anesthesia Care and Evaluation    Patient location during evaluation: PACU  Patient participation: complete - patient cannot participate  Level of consciousness: responsive to light touch, responsive to physical stimuli, awake and responsive to verbal stimuli  Pain score: 0  Pain management: adequate  Airway patency: patent  Anesthetic complications: No anesthetic complications  PONV Status: controlled  Cardiovascular status: acceptable and hemodynamically stable  Respiratory status: acceptable and face mask  Hydration status: acceptable    Comments: Satisfactory progress.Patient seen and examined postoperatively; vital signs stable; SpO2 greater than or equal to 90%; cardiopulmonary status stable; nausea/vomiting adequately controlled; pain adequately controlled; no apparent anesthesia complications; patient discharged from anesthesia care when discharge criteria were met

## 2021-06-25 ENCOUNTER — TELEPHONE (OUTPATIENT)
Dept: SURGERY | Facility: CLINIC | Age: 36
End: 2021-06-25

## 2021-06-25 NOTE — TELEPHONE ENCOUNTER
Pt called and stated his wife was cleaning the bathroom and knocked his hydrocodone in the toilet and wanted to know if he could have more, explain to him that Dr. Champion was out of the office and he could call or go to his pcp.

## 2021-06-28 DIAGNOSIS — K40.30 INCARCERATED LEFT INGUINAL HERNIA: ICD-10-CM

## 2021-06-28 RX ORDER — OXYCODONE AND ACETAMINOPHEN 10; 325 MG/1; MG/1
1 TABLET ORAL EVERY 6 HOURS PRN
Qty: 30 TABLET | Refills: 0 | Status: SHIPPED | OUTPATIENT
Start: 2021-06-28

## 2023-08-13 ENCOUNTER — APPOINTMENT (OUTPATIENT)
Dept: CT IMAGING | Facility: HOSPITAL | Age: 38
End: 2023-08-13
Payer: COMMERCIAL

## 2023-08-13 ENCOUNTER — HOSPITAL ENCOUNTER (OUTPATIENT)
Facility: HOSPITAL | Age: 38
Discharge: HOME | End: 2023-08-14
Attending: EMERGENCY MEDICINE | Admitting: SURGERY
Payer: COMMERCIAL

## 2023-08-13 ENCOUNTER — ANESTHESIA EVENT (OUTPATIENT)
Dept: PERIOP | Facility: HOSPITAL | Age: 38
End: 2023-08-13
Payer: COMMERCIAL

## 2023-08-13 ENCOUNTER — ANESTHESIA (OUTPATIENT)
Dept: PERIOP | Facility: HOSPITAL | Age: 38
End: 2023-08-13
Payer: COMMERCIAL

## 2023-08-13 DIAGNOSIS — K40.30 INCARCERATED LEFT INGUINAL HERNIA: Primary | ICD-10-CM

## 2023-08-13 LAB
ANION GAP SERPL CALCULATED.3IONS-SCNC: 16 MMOL/L (ref 5–15)
BASOPHILS # BLD AUTO: 0 10*3/MM3 (ref 0–0.2)
BASOPHILS NFR BLD AUTO: 0.4 % (ref 0–1.5)
BUN SERPL-MCNC: 12 MG/DL (ref 6–20)
BUN/CREAT SERPL: 15.2 (ref 7–25)
CALCIUM SPEC-SCNC: 9.5 MG/DL (ref 8.6–10.5)
CHLORIDE SERPL-SCNC: 101 MMOL/L (ref 98–107)
CO2 SERPL-SCNC: 19 MMOL/L (ref 22–29)
CREAT SERPL-MCNC: 0.79 MG/DL (ref 0.76–1.27)
DEPRECATED RDW RBC AUTO: 44.2 FL (ref 37–54)
EGFRCR SERPLBLD CKD-EPI 2021: 115.9 ML/MIN/1.73
EOSINOPHIL # BLD AUTO: 0.1 10*3/MM3 (ref 0–0.4)
EOSINOPHIL NFR BLD AUTO: 0.9 % (ref 0.3–6.2)
ERYTHROCYTE [DISTWIDTH] IN BLOOD BY AUTOMATED COUNT: 14.3 % (ref 12.3–15.4)
GLUCOSE SERPL-MCNC: 83 MG/DL (ref 65–99)
HCT VFR BLD AUTO: 50.6 % (ref 37.5–51)
HGB BLD-MCNC: 16.4 G/DL (ref 13–17.7)
LYMPHOCYTES # BLD AUTO: 2.6 10*3/MM3 (ref 0.7–3.1)
LYMPHOCYTES NFR BLD AUTO: 26.6 % (ref 19.6–45.3)
MCH RBC QN AUTO: 28.5 PG (ref 26.6–33)
MCHC RBC AUTO-ENTMCNC: 32.5 G/DL (ref 31.5–35.7)
MCV RBC AUTO: 87.8 FL (ref 79–97)
MONOCYTES # BLD AUTO: 0.7 10*3/MM3 (ref 0.1–0.9)
MONOCYTES NFR BLD AUTO: 7.4 % (ref 5–12)
NEUTROPHILS NFR BLD AUTO: 6.3 10*3/MM3 (ref 1.7–7)
NEUTROPHILS NFR BLD AUTO: 64.7 % (ref 42.7–76)
NRBC BLD AUTO-RTO: 0 /100 WBC (ref 0–0.2)
PLATELET # BLD AUTO: 273 10*3/MM3 (ref 140–450)
PMV BLD AUTO: 8.4 FL (ref 6–12)
POTASSIUM SERPL-SCNC: 4.7 MMOL/L (ref 3.5–5.2)
RBC # BLD AUTO: 5.76 10*6/MM3 (ref 4.14–5.8)
SODIUM SERPL-SCNC: 136 MMOL/L (ref 136–145)
WBC NRBC COR # BLD: 9.8 10*3/MM3 (ref 3.4–10.8)

## 2023-08-13 PROCEDURE — 74176 CT ABD & PELVIS W/O CONTRAST: CPT

## 2023-08-13 PROCEDURE — 25010000002 SUGAMMADEX 200 MG/2ML SOLUTION: Performed by: NURSE ANESTHETIST, CERTIFIED REGISTERED

## 2023-08-13 PROCEDURE — 25010000002 DEXAMETHASONE PER 1 MG: Performed by: NURSE ANESTHETIST, CERTIFIED REGISTERED

## 2023-08-13 PROCEDURE — 25010000002 ONDANSETRON PER 1 MG: Performed by: NURSE ANESTHETIST, CERTIFIED REGISTERED

## 2023-08-13 PROCEDURE — G0378 HOSPITAL OBSERVATION PER HR: HCPCS

## 2023-08-13 PROCEDURE — 25010000002 CEFAZOLIN PER 500 MG: Performed by: NURSE ANESTHETIST, CERTIFIED REGISTERED

## 2023-08-13 PROCEDURE — 25010000002 MORPHINE PER 10 MG: Performed by: EMERGENCY MEDICINE

## 2023-08-13 PROCEDURE — 25010000002 HYDROMORPHONE 1 MG/ML SOLUTION: Performed by: NURSE ANESTHETIST, CERTIFIED REGISTERED

## 2023-08-13 PROCEDURE — 25010000002 MORPHINE PER 10 MG: Performed by: SURGERY

## 2023-08-13 PROCEDURE — 88302 TISSUE EXAM BY PATHOLOGIST: CPT | Performed by: SURGERY

## 2023-08-13 PROCEDURE — 25010000002 FENTANYL CITRATE (PF) 100 MCG/2ML SOLUTION: Performed by: NURSE ANESTHETIST, CERTIFIED REGISTERED

## 2023-08-13 PROCEDURE — 96375 TX/PRO/DX INJ NEW DRUG ADDON: CPT

## 2023-08-13 PROCEDURE — 25010000002 MIDAZOLAM PER 1 MG: Performed by: NURSE ANESTHETIST, CERTIFIED REGISTERED

## 2023-08-13 PROCEDURE — 85025 COMPLETE CBC W/AUTO DIFF WBC: CPT | Performed by: EMERGENCY MEDICINE

## 2023-08-13 PROCEDURE — 25010000002 KETOROLAC TROMETHAMINE PER 15 MG: Performed by: NURSE ANESTHETIST, CERTIFIED REGISTERED

## 2023-08-13 PROCEDURE — 25010000002 PROPOFOL 1000 MG/100ML EMULSION: Performed by: NURSE ANESTHETIST, CERTIFIED REGISTERED

## 2023-08-13 PROCEDURE — 25010000002 SUCCINYLCHOLINE PER 20 MG: Performed by: NURSE ANESTHETIST, CERTIFIED REGISTERED

## 2023-08-13 PROCEDURE — 36415 COLL VENOUS BLD VENIPUNCTURE: CPT

## 2023-08-13 PROCEDURE — 99285 EMERGENCY DEPT VISIT HI MDM: CPT

## 2023-08-13 PROCEDURE — 25010000002 FENTANYL CITRATE (PF) 50 MCG/ML SOLUTION: Performed by: NURSE ANESTHETIST, CERTIFIED REGISTERED

## 2023-08-13 PROCEDURE — 96374 THER/PROPH/DIAG INJ IV PUSH: CPT

## 2023-08-13 PROCEDURE — 88305 TISSUE EXAM BY PATHOLOGIST: CPT | Performed by: SURGERY

## 2023-08-13 PROCEDURE — 80048 BASIC METABOLIC PNL TOTAL CA: CPT | Performed by: EMERGENCY MEDICINE

## 2023-08-13 PROCEDURE — C1781 MESH (IMPLANTABLE): HCPCS | Performed by: SURGERY

## 2023-08-13 PROCEDURE — 25010000002 BUPIVACAINE (PF) 0.25 % SOLUTION: Performed by: SURGERY

## 2023-08-13 DEVICE — MESH FLUT SHT 3X6IN: Type: IMPLANTABLE DEVICE | Site: INGUINAL | Status: FUNCTIONAL

## 2023-08-13 RX ORDER — SODIUM CHLORIDE 0.9 % (FLUSH) 0.9 %
10 SYRINGE (ML) INJECTION AS NEEDED
Status: DISCONTINUED | OUTPATIENT
Start: 2023-08-13 | End: 2023-08-14 | Stop reason: HOSPADM

## 2023-08-13 RX ORDER — PROMETHAZINE HYDROCHLORIDE 25 MG/1
25 SUPPOSITORY RECTAL ONCE AS NEEDED
Status: DISCONTINUED | OUTPATIENT
Start: 2023-08-13 | End: 2023-08-13

## 2023-08-13 RX ORDER — LIDOCAINE HYDROCHLORIDE 20 MG/ML
INJECTION, SOLUTION EPIDURAL; INFILTRATION; INTRACAUDAL; PERINEURAL AS NEEDED
Status: DISCONTINUED | OUTPATIENT
Start: 2023-08-13 | End: 2023-08-13 | Stop reason: SURG

## 2023-08-13 RX ORDER — POLYETHYLENE GLYCOL 3350 17 G/17G
17 POWDER, FOR SOLUTION ORAL DAILY PRN
Status: DISCONTINUED | OUTPATIENT
Start: 2023-08-13 | End: 2023-08-14 | Stop reason: HOSPADM

## 2023-08-13 RX ORDER — ROCURONIUM BROMIDE 10 MG/ML
INJECTION, SOLUTION INTRAVENOUS AS NEEDED
Status: DISCONTINUED | OUTPATIENT
Start: 2023-08-13 | End: 2023-08-13 | Stop reason: SURG

## 2023-08-13 RX ORDER — SUCCINYLCHOLINE CHLORIDE 20 MG/ML
INJECTION INTRAMUSCULAR; INTRAVENOUS AS NEEDED
Status: DISCONTINUED | OUTPATIENT
Start: 2023-08-13 | End: 2023-08-13 | Stop reason: SURG

## 2023-08-13 RX ORDER — MORPHINE SULFATE 2 MG/ML
2 INJECTION, SOLUTION INTRAMUSCULAR; INTRAVENOUS ONCE
Status: COMPLETED | OUTPATIENT
Start: 2023-08-13 | End: 2023-08-13

## 2023-08-13 RX ORDER — FLUMAZENIL 0.1 MG/ML
0.2 INJECTION INTRAVENOUS AS NEEDED
Status: DISCONTINUED | OUTPATIENT
Start: 2023-08-13 | End: 2023-08-13

## 2023-08-13 RX ORDER — CHOLECALCIFEROL (VITAMIN D3) 125 MCG
5 CAPSULE ORAL NIGHTLY PRN
Status: DISCONTINUED | OUTPATIENT
Start: 2023-08-13 | End: 2023-08-14 | Stop reason: HOSPADM

## 2023-08-13 RX ORDER — KETOROLAC TROMETHAMINE 15 MG/ML
INJECTION, SOLUTION INTRAMUSCULAR; INTRAVENOUS AS NEEDED
Status: DISCONTINUED | OUTPATIENT
Start: 2023-08-13 | End: 2023-08-13 | Stop reason: SURG

## 2023-08-13 RX ORDER — PROMETHAZINE HYDROCHLORIDE 25 MG/1
25 TABLET ORAL ONCE AS NEEDED
Status: DISCONTINUED | OUTPATIENT
Start: 2023-08-13 | End: 2023-08-13

## 2023-08-13 RX ORDER — LABETALOL HYDROCHLORIDE 5 MG/ML
5 INJECTION, SOLUTION INTRAVENOUS
Status: DISCONTINUED | OUTPATIENT
Start: 2023-08-13 | End: 2023-08-13

## 2023-08-13 RX ORDER — SODIUM CHLORIDE 0.9 % (FLUSH) 0.9 %
10 SYRINGE (ML) INJECTION EVERY 12 HOURS SCHEDULED
Status: DISCONTINUED | OUTPATIENT
Start: 2023-08-13 | End: 2023-08-14 | Stop reason: HOSPADM

## 2023-08-13 RX ORDER — NALOXONE HCL 0.4 MG/ML
0.2 VIAL (ML) INJECTION AS NEEDED
Status: DISCONTINUED | OUTPATIENT
Start: 2023-08-13 | End: 2023-08-13

## 2023-08-13 RX ORDER — DEXAMETHASONE SODIUM PHOSPHATE 4 MG/ML
INJECTION, SOLUTION INTRA-ARTICULAR; INTRALESIONAL; INTRAMUSCULAR; INTRAVENOUS; SOFT TISSUE AS NEEDED
Status: DISCONTINUED | OUTPATIENT
Start: 2023-08-13 | End: 2023-08-13 | Stop reason: SURG

## 2023-08-13 RX ORDER — ONDANSETRON 2 MG/ML
INJECTION INTRAMUSCULAR; INTRAVENOUS AS NEEDED
Status: DISCONTINUED | OUTPATIENT
Start: 2023-08-13 | End: 2023-08-13 | Stop reason: SURG

## 2023-08-13 RX ORDER — IPRATROPIUM BROMIDE AND ALBUTEROL SULFATE 2.5; .5 MG/3ML; MG/3ML
3 SOLUTION RESPIRATORY (INHALATION) ONCE AS NEEDED
Status: DISCONTINUED | OUTPATIENT
Start: 2023-08-13 | End: 2023-08-13

## 2023-08-13 RX ORDER — DROPERIDOL 2.5 MG/ML
0.62 INJECTION, SOLUTION INTRAMUSCULAR; INTRAVENOUS
Status: DISCONTINUED | OUTPATIENT
Start: 2023-08-13 | End: 2023-08-13

## 2023-08-13 RX ORDER — DIPHENHYDRAMINE HYDROCHLORIDE 50 MG/ML
12.5 INJECTION INTRAMUSCULAR; INTRAVENOUS
Status: DISCONTINUED | OUTPATIENT
Start: 2023-08-13 | End: 2023-08-13

## 2023-08-13 RX ORDER — SODIUM CHLORIDE 9 MG/ML
INJECTION, SOLUTION INTRAVENOUS CONTINUOUS PRN
Status: DISCONTINUED | OUTPATIENT
Start: 2023-08-13 | End: 2023-08-13 | Stop reason: SURG

## 2023-08-13 RX ORDER — HYDROCODONE BITARTRATE AND ACETAMINOPHEN 5; 325 MG/1; MG/1
1 TABLET ORAL EVERY 4 HOURS PRN
Status: DISCONTINUED | OUTPATIENT
Start: 2023-08-13 | End: 2023-08-14 | Stop reason: HOSPADM

## 2023-08-13 RX ORDER — BUPIVACAINE HYDROCHLORIDE 2.5 MG/ML
INJECTION, SOLUTION EPIDURAL; INFILTRATION; INTRACAUDAL AS NEEDED
Status: DISCONTINUED | OUTPATIENT
Start: 2023-08-13 | End: 2023-08-13 | Stop reason: HOSPADM

## 2023-08-13 RX ORDER — ETOMIDATE 2 MG/ML
20 INJECTION INTRAVENOUS ONCE
Status: COMPLETED | OUTPATIENT
Start: 2023-08-13 | End: 2023-08-13

## 2023-08-13 RX ORDER — CEFAZOLIN 2 G/1
INJECTION, POWDER, FOR SOLUTION INTRAMUSCULAR; INTRAVENOUS AS NEEDED
Status: DISCONTINUED | OUTPATIENT
Start: 2023-08-13 | End: 2023-08-13 | Stop reason: SURG

## 2023-08-13 RX ORDER — ONDANSETRON 4 MG/1
4 TABLET, FILM COATED ORAL EVERY 6 HOURS PRN
Status: DISCONTINUED | OUTPATIENT
Start: 2023-08-13 | End: 2023-08-14 | Stop reason: HOSPADM

## 2023-08-13 RX ORDER — AMOXICILLIN 250 MG
2 CAPSULE ORAL 2 TIMES DAILY
Status: DISCONTINUED | OUTPATIENT
Start: 2023-08-13 | End: 2023-08-14 | Stop reason: HOSPADM

## 2023-08-13 RX ORDER — FENTANYL CITRATE 50 UG/ML
INJECTION, SOLUTION INTRAMUSCULAR; INTRAVENOUS AS NEEDED
Status: DISCONTINUED | OUTPATIENT
Start: 2023-08-13 | End: 2023-08-13 | Stop reason: SURG

## 2023-08-13 RX ORDER — BISACODYL 10 MG
10 SUPPOSITORY, RECTAL RECTAL DAILY PRN
Status: DISCONTINUED | OUTPATIENT
Start: 2023-08-13 | End: 2023-08-14 | Stop reason: HOSPADM

## 2023-08-13 RX ORDER — FENTANYL CITRATE 50 UG/ML
50 INJECTION, SOLUTION INTRAMUSCULAR; INTRAVENOUS
Status: DISCONTINUED | OUTPATIENT
Start: 2023-08-13 | End: 2023-08-13

## 2023-08-13 RX ORDER — ONDANSETRON 2 MG/ML
4 INJECTION INTRAMUSCULAR; INTRAVENOUS ONCE AS NEEDED
Status: DISCONTINUED | OUTPATIENT
Start: 2023-08-13 | End: 2023-08-13

## 2023-08-13 RX ORDER — SODIUM CHLORIDE 9 MG/ML
40 INJECTION, SOLUTION INTRAVENOUS AS NEEDED
Status: DISCONTINUED | OUTPATIENT
Start: 2023-08-13 | End: 2023-08-14 | Stop reason: HOSPADM

## 2023-08-13 RX ORDER — MIDAZOLAM HYDROCHLORIDE 1 MG/ML
INJECTION INTRAMUSCULAR; INTRAVENOUS AS NEEDED
Status: DISCONTINUED | OUTPATIENT
Start: 2023-08-13 | End: 2023-08-13 | Stop reason: SURG

## 2023-08-13 RX ORDER — ONDANSETRON 2 MG/ML
4 INJECTION INTRAMUSCULAR; INTRAVENOUS EVERY 6 HOURS PRN
Status: DISCONTINUED | OUTPATIENT
Start: 2023-08-13 | End: 2023-08-14 | Stop reason: HOSPADM

## 2023-08-13 RX ORDER — HYDROCODONE BITARTRATE AND ACETAMINOPHEN 5; 325 MG/1; MG/1
1 TABLET ORAL ONCE AS NEEDED
Status: COMPLETED | OUTPATIENT
Start: 2023-08-13 | End: 2023-08-13

## 2023-08-13 RX ORDER — BISACODYL 5 MG/1
5 TABLET, DELAYED RELEASE ORAL DAILY PRN
Status: DISCONTINUED | OUTPATIENT
Start: 2023-08-13 | End: 2023-08-14 | Stop reason: HOSPADM

## 2023-08-13 RX ORDER — PROPOFOL 10 MG/ML
INJECTION, EMULSION INTRAVENOUS AS NEEDED
Status: DISCONTINUED | OUTPATIENT
Start: 2023-08-13 | End: 2023-08-13 | Stop reason: SURG

## 2023-08-13 RX ORDER — HYDRALAZINE HYDROCHLORIDE 20 MG/ML
5 INJECTION INTRAMUSCULAR; INTRAVENOUS
Status: DISCONTINUED | OUTPATIENT
Start: 2023-08-13 | End: 2023-08-13

## 2023-08-13 RX ADMIN — HYDROMORPHONE HYDROCHLORIDE 1 MG: 1 INJECTION, SOLUTION INTRAMUSCULAR; INTRAVENOUS; SUBCUTANEOUS at 15:46

## 2023-08-13 RX ADMIN — HYDROCODONE BITARTRATE AND ACETAMINOPHEN 1 TABLET: 5; 325 TABLET ORAL at 17:23

## 2023-08-13 RX ADMIN — SUGAMMADEX 200 MG: 100 INJECTION, SOLUTION INTRAVENOUS at 16:30

## 2023-08-13 RX ADMIN — FENTANYL CITRATE 50 MCG: 50 INJECTION, SOLUTION INTRAMUSCULAR; INTRAVENOUS at 15:41

## 2023-08-13 RX ADMIN — LIDOCAINE HYDROCHLORIDE 100 MG: 20 INJECTION, SOLUTION EPIDURAL; INFILTRATION; INTRACAUDAL; PERINEURAL at 15:26

## 2023-08-13 RX ADMIN — PROPOFOL INJECTABLE EMULSION 200 MG: 10 INJECTION, EMULSION INTRAVENOUS at 15:26

## 2023-08-13 RX ADMIN — HYDROMORPHONE HYDROCHLORIDE 0.5 MG: 1 INJECTION, SOLUTION INTRAMUSCULAR; INTRAVENOUS; SUBCUTANEOUS at 17:26

## 2023-08-13 RX ADMIN — Medication 10 ML: at 21:28

## 2023-08-13 RX ADMIN — KETOROLAC TROMETHAMINE 30 MG: 15 INJECTION, SOLUTION INTRAMUSCULAR; INTRAVENOUS at 16:28

## 2023-08-13 RX ADMIN — FENTANYL CITRATE 50 MCG: 50 INJECTION, SOLUTION INTRAMUSCULAR; INTRAVENOUS at 15:35

## 2023-08-13 RX ADMIN — SODIUM CHLORIDE: 9 INJECTION, SOLUTION INTRAVENOUS at 15:19

## 2023-08-13 RX ADMIN — FENTANYL CITRATE 50 MCG: 50 INJECTION, SOLUTION INTRAMUSCULAR; INTRAVENOUS at 15:27

## 2023-08-13 RX ADMIN — MIDAZOLAM 2 MG: 1 INJECTION INTRAMUSCULAR; INTRAVENOUS at 15:23

## 2023-08-13 RX ADMIN — ONDANSETRON 4 MG: 2 INJECTION INTRAMUSCULAR; INTRAVENOUS at 15:28

## 2023-08-13 RX ADMIN — ROCURONIUM BROMIDE 30 MG: 50 INJECTION INTRAVENOUS at 15:30

## 2023-08-13 RX ADMIN — MORPHINE SULFATE 2 MG: 4 INJECTION, SOLUTION INTRAMUSCULAR; INTRAVENOUS at 21:27

## 2023-08-13 RX ADMIN — DEXAMETHASONE SODIUM PHOSPHATE 8 MG: 4 INJECTION, SOLUTION INTRAMUSCULAR; INTRAVENOUS at 15:28

## 2023-08-13 RX ADMIN — ETOMIDATE 20 MG: 2 INJECTION INTRAVENOUS at 12:47

## 2023-08-13 RX ADMIN — CEFAZOLIN 2 G: 2 INJECTION, POWDER, FOR SOLUTION INTRAMUSCULAR; INTRAVENOUS at 15:29

## 2023-08-13 RX ADMIN — SUCCINYLCHOLINE CHLORIDE 100 MG: 20 INJECTION, SOLUTION INTRAMUSCULAR; INTRAVENOUS at 15:26

## 2023-08-13 RX ADMIN — MORPHINE SULFATE 2 MG: 2 INJECTION, SOLUTION INTRAMUSCULAR; INTRAVENOUS at 12:24

## 2023-08-13 RX ADMIN — SODIUM CHLORIDE: 9 INJECTION, SOLUTION INTRAVENOUS at 16:10

## 2023-08-13 RX ADMIN — HYDROCODONE BITARTRATE AND ACETAMINOPHEN 1 TABLET: 5; 325 TABLET ORAL at 22:55

## 2023-08-13 RX ADMIN — FENTANYL CITRATE 50 MCG: 50 INJECTION, SOLUTION INTRAMUSCULAR; INTRAVENOUS at 17:00

## 2023-08-13 RX ADMIN — FENTANYL CITRATE 50 MCG: 50 INJECTION, SOLUTION INTRAMUSCULAR; INTRAVENOUS at 15:23

## 2023-08-13 NOTE — CONSULTS
Consults      General Surgery Consult Note      Name: Ga Dye ADMIT: 2023   : 1985  PCP: Provider, No Known    MRN: [id number] LOS: 0 days   AGE/SEX: 38 y.o. male  ROOM:    [facility]      Patient Care Team:  Provider, No Known as PCP - General  Chief Complaint   Patient presents with    Groin Pain       Subjective   38-year-old gentleman with a history of a left inguinal hernia who underwent laparoscopic left inguinal hernia repair with Dr. Champion 2 years ago who had recurrent of his hernia approximately 1 year later and has been dealing with a recurrent hernia ever since.  He says that usually it is reducible but over the last couple of days the hernia has been stuck out causing significant pain.  He says it is causing some dysuria last bowel movement was yesterday.  On arrival hernia was attempted to be reduced with some sedation was unable to be reduce.  Because of this being a recurrent hernia went ahead and requested CT scan demonstrating sigmoid colon stuck in the left inguinal canal.    Past Medical History:   Diagnosis Date    Anxiety     Depression     Hypertension     Inguinal hernia      Past Surgical History:   Procedure Laterality Date    ADENOIDECTOMY      FRACTURE SURGERY      INGUINAL HERNIA REPAIR Left 2021    Procedure: INGUINAL HERNIA REPAIR LAPAROSCOPIC;  Surgeon: Jason Champion DO;  Location: [facility] OR;  Service: General;  Laterality: Left;    LEG SURGERY      TONSILLECTOMY       No family history on file.    Social History     Tobacco Use    Smoking status: Every Day    Tobacco comments:     vapes all day-uses thc   Vaping Use    Vaping Use: Every day    Substances: THC   Substance Use Topics    Alcohol use: Yes     Alcohol/week: 5.0 standard drinks     Types: 5 Cans of beer per week    Drug use: Yes     Types: Marijuana     (Not in a hospital admission)            [COMPLETED] Insert Peripheral IV **AND** sodium chloride  Tylenol [acetaminophen] and  "Tramadol    Review of Systems   Constitutional:  Negative for chills and fever.   HENT:  Negative for sore throat and trouble swallowing.    Eyes:  Negative for blurred vision and double vision.   Respiratory:  Negative for cough and shortness of breath.    Cardiovascular:  Negative for chest pain and leg swelling.   Gastrointestinal:  Positive for abdominal pain. Negative for abdominal distention, blood in stool, constipation, diarrhea, nausea and vomiting.   Genitourinary:  Negative for dysuria and hematuria.   Skin:  Negative for rash and wound.   Neurological:  Negative for dizziness and confusion.   Psychiatric/Behavioral:  Negative for agitation and depressed mood.       Objective     Vital Signs and Labs:  Vital Signs Patient Vitals for the past 24 hrs:   BP Temp Temp src Pulse Resp SpO2 Height Weight   08/13/23 1403 144/87 -- -- 65 -- 98 % -- --   08/13/23 1333 140/82 -- -- 56 -- 95 % -- --   08/13/23 1300 -- -- -- 73 -- 96 % -- --   08/13/23 1259 -- -- -- 90 -- 96 % -- --   08/13/23 1257 -- -- -- (!) 126 -- 97 % -- --   08/13/23 1256 163/96 -- -- (!) 124 -- 97 % -- --   08/13/23 1254 -- -- -- 120 -- 99 % -- --   08/13/23 1253 -- -- -- 94 -- 96 % -- --   08/13/23 1251 (!) 160/112 -- -- 91 -- 99 % -- --   08/13/23 1248 -- -- -- -- -- 97 % -- --   08/13/23 1248 -- -- -- 71 -- 96 % -- --   08/13/23 1247 154/85 -- -- 61 11 96 % -- --   08/13/23 1245 -- -- -- 76 -- 98 % -- --   08/13/23 1242 142/98 -- -- 65 -- 96 % -- --   08/13/23 1231 141/93 -- -- 61 -- 95 % -- --   08/13/23 1225 -- -- -- 69 -- 97 % -- --   08/13/23 1215 163/89 -- -- 66 -- 97 % -- --   08/13/23 1111 160/98 97.9 øF (36.6 øC) Oral 96 18 98 % -- --   08/13/23 1107 -- -- -- -- -- -- 182.9 cm (72\") 91.8 kg (202 lb 6.1 oz)       Physical Exam:  Physical Exam  Constitutional:       Appearance: Normal appearance. He is well-developed.   HENT:      Head: Normocephalic and atraumatic.   Eyes:      General: No scleral icterus.     Conjunctiva/sclera: " Conjunctivae normal.   Neck:      Trachea: No tracheal deviation.   Cardiovascular:      Rate and Rhythm: Normal rate.      Pulses: Normal pulses.   Pulmonary:      Effort: Pulmonary effort is normal. No respiratory distress.   Abdominal:      General: There is no distension.      Palpations: Abdomen is soft.   Genitourinary:     Comments: In the left groin there is a firm lump that is tender to palpation without any overlying skin changes  Musculoskeletal:         General: No swelling or tenderness.      Cervical back: Neck supple. No tenderness. No muscular tenderness.   Skin:     General: Skin is warm and dry.   Neurological:      General: No focal deficit present.      Mental Status: He is alert. Mental status is at baseline.   Psychiatric:         Mood and Affect: Mood normal.         Behavior: Behavior normal.       CBC    Results from last 7 days   Lab Units 08/13/23  1307   WBC 10*3/mm3 9.80   HEMOGLOBIN g/dL 16.4   PLATELETS 10*3/mm3 273     BMP   Results from last 7 days   Lab Units 08/13/23  1307   SODIUM mmol/L 136   POTASSIUM mmol/L 4.7   CHLORIDE mmol/L 101   CO2 mmol/L 19.0*   BUN mg/dL 12   CREATININE mg/dL 0.79   GLUCOSE mg/dL 83     Radiology(recent) CT Abdomen Pelvis Without Contrast    Result Date: 8/13/2023  1.A moderate left inguinal hernia is noted. The proximal sigmoid colon partially extends into the hernia. There is mild stranding in the soft tissues of the hernia. These findings may indicate developing entrapment. There is no bowel edema at this time. No bowel dilatation is observed. There are no definitive signs of obstruction at this time. 2.There is no bowel perforation. There is no abscess. 3.No evidence for significant nephrolithiasis. There is no evidence for obstructive uropathy. Electronically Signed: Paul Reynoso  8/13/2023 2:03 PM EDT  Workstation ID: [id number]     I reviewed the patient's new clinical results.  I reviewed the patient's new imaging results and agree with the  interpretation.    Assessment & Plan       Incarcerated left inguinal hernia      38 y.o. male with an incarcerated recurrent left inguinal hernia    Counseled him about the risk and benefits of open left inguinal hernia repair.  We talked about the steps of the operation anticipated cover the possible complications.  Talked about the risks and benefits of the use of mesh.  Talked about the risk of mesh infection pending the bowel viability.  Talked about the possibility for a chronic pain after our discussion about the risks and benefits of this repair he understands and is willing to proceed with the open left inguinal hernia repair.      This note was created using Dragon Voice Recognition software.    Daniel Reeder MD  08/13/23  14:26 EDT

## 2023-08-13 NOTE — ED PROVIDER NOTES
Subjective   History of Present Illness  Patient is a 38-year-old male complaining of left groin pain for the past 24 hours.  He states that the inguinal hernia in the past that was repaired.  He states the pain is acute and severe.  Denies fever vomiting diarrhea or other complaint    Review of Systems    Past Medical History:   Diagnosis Date    Anxiety     Depression     Hypertension     Inguinal hernia        Allergies   Allergen Reactions    Tylenol [Acetaminophen] Mental Status Change    Tramadol Nausea And Vomiting       Past Surgical History:   Procedure Laterality Date    ADENOIDECTOMY      FRACTURE SURGERY      INGUINAL HERNIA REPAIR Left 6/21/2021    Procedure: INGUINAL HERNIA REPAIR LAPAROSCOPIC;  Surgeon: Jason Champion DO;  Location: [facility] MAIN OR;  Service: General;  Laterality: Left;    LEG SURGERY      TONSILLECTOMY         No family history on file.    Social History     Socioeconomic History    Marital status: Significant Other   Tobacco Use    Smoking status: Every Day    Tobacco comments:     vapes all day-uses thc   Vaping Use    Vaping Use: Every day    Substances: THC   Substance and Sexual Activity    Alcohol use: Yes     Alcohol/week: 5.0 standard drinks     Types: 5 Cans of beer per week    Drug use: Yes     Types: Marijuana    Sexual activity: Defer           Objective   Physical Exam  Lungs are clear.  Heart has regular rhythm.  Abdomen soft nontender.   exam shows patient with a palpable left inguinal hernia which is tender to palpation.  Patient has 2+ distal pulses  Procedures     Patient was given both morphine and etomidate IV.  I attempted to reduce the patient's inguinal hernia without success.      ED Course      Results for orders placed or performed during the hospital encounter of 08/13/23   Basic Metabolic Panel    Specimen: Arm, Right; Blood   Result Value Ref Range    Glucose 83 65 - 99 mg/dL    BUN 12 6 - 20 mg/dL    Creatinine 0.79 0.76 - 1.27 mg/dL    Sodium 136 136  - 145 mmol/L    Potassium 4.7 3.5 - 5.2 mmol/L    Chloride 101 98 - 107 mmol/L    CO2 19.0 (L) 22.0 - 29.0 mmol/L    Calcium 9.5 8.6 - 10.5 mg/dL    BUN/Creatinine Ratio 15.2 7.0 - 25.0    Anion Gap 16.0 (H) 5.0 - 15.0 mmol/L    eGFR 115.9 >60.0 mL/min/1.73   CBC Auto Differential    Specimen: Arm, Right; Blood   Result Value Ref Range    WBC 9.80 3.40 - 10.80 10*3/mm3    RBC 5.76 4.14 - 5.80 10*6/mm3    Hemoglobin 16.4 13.0 - 17.7 g/dL    Hematocrit 50.6 37.5 - 51.0 %    MCV 87.8 79.0 - 97.0 fL    MCH 28.5 26.6 - 33.0 pg    MCHC 32.5 31.5 - 35.7 g/dL    RDW 14.3 12.3 - 15.4 %    RDW-SD 44.2 37.0 - 54.0 fl    MPV 8.4 6.0 - 12.0 fL    Platelets 273 140 - 450 10*3/mm3    Neutrophil % 64.7 42.7 - 76.0 %    Lymphocyte % 26.6 19.6 - 45.3 %    Monocyte % 7.4 5.0 - 12.0 %    Eosinophil % 0.9 0.3 - 6.2 %    Basophil % 0.4 0.0 - 1.5 %    Neutrophils, Absolute 6.30 1.70 - 7.00 10*3/mm3    Lymphocytes, Absolute 2.60 0.70 - 3.10 10*3/mm3    Monocytes, Absolute 0.70 0.10 - 0.90 10*3/mm3    Eosinophils, Absolute 0.10 0.00 - 0.40 10*3/mm3    Basophils, Absolute 0.00 0.00 - 0.20 10*3/mm3    nRBC 0.0 0.0 - 0.2 /100 WBC                                          Medical Decision Making  CBC shows no leukocytosis or anemia.  Metabolic panel at baseline with no renal insufficiency or electrolyte abnormality.  I did speak to the on-call surgeon.  Patient be placed in ED observation and will have operative repair of his left inguinal hernia.    Amount and/or Complexity of Data Reviewed  Labs: ordered. Decision-making details documented in ED Course.  Radiology: ordered and independent interpretation performed.    Risk  Prescription drug management.  Decision regarding hospitalization.        Final diagnoses:   Incarcerated left inguinal hernia       ED Disposition  ED Disposition       ED Disposition   Decision to Admit    Condition   --    Comment   --               No follow-up provider specified.       Medication List      No changes  were made to your prescriptions during this visit.            Juan Frances MD  08/13/23 0002

## 2023-08-13 NOTE — ANESTHESIA PROCEDURE NOTES
Airway  Urgency: elective    Date/Time: 8/13/2023 3:27 PM  Airway not difficult    General Information and Staff    Patient location during procedure: OR  CRNA/CAA: Guerline Holland, CRNA    Indications and Patient Condition  Indications for airway management: airway protection    Preoxygenated: yes  Mask difficulty assessment: 1 - vent by mask    Final Airway Details  Final airway type: endotracheal airway      Successful airway: ETT  Cuffed: yes   Successful intubation technique: direct laryngoscopy and video laryngoscopy  Facilitating devices/methods: intubating stylet  Endotracheal tube insertion site: oral  Blade: Spivey  Blade size: 4  ETT size (mm): 7.5  Cormack-Lehane Classification: grade I - full view of glottis  Placement verified by: chest auscultation and capnometry   Cuff volume (mL): 8  Measured from: lips  Number of attempts at approach: 1    Additional Comments  Atraumatic placement of ETT, dentition unchanged from preop.

## 2023-08-13 NOTE — ANESTHESIA PREPROCEDURE EVALUATION
Anesthesia Evaluation     Patient summary reviewed and Nursing notes reviewed   NPO Solid Status: > 8 hours  NPO Liquid Status: > 8 hours           Airway   Mallampati: II  TM distance: >3 FB  Neck ROM: full  No difficulty expected  Dental    (+) poor dentition    Pulmonary - negative pulmonary ROS and normal exam    breath sounds clear to auscultation  (+) a smoker Current,  Cardiovascular - normal exam    Rhythm: regular  Rate: normal    (+) hypertension      Neuro/Psych  GI/Hepatic/Renal/Endo - negative ROS     Musculoskeletal (-) negative ROS    Abdominal  - normal exam   Substance History - negative use  (+) alcohol use, drug use     OB/GYN          Other                        Anesthesia Plan    ASA 2     general     intravenous induction     Anesthetic plan, risks, benefits, and alternatives have been provided, discussed and informed consent has been obtained with: patient.

## 2023-08-13 NOTE — ANESTHESIA POSTPROCEDURE EVALUATION
Patient: Ga Dye    Procedure Summary       Date: 08/13/23 Room / Location: [facility] OR 08 / [facility] MAIN OR    Anesthesia Start: 1519 Anesthesia Stop: 1650    Procedure: INGUINAL HERNIA REPAIR WITH MESH (Left: Groin) Diagnosis:       Incarcerated left inguinal hernia      (Incarcerated left inguinal hernia [K40.30])    Surgeons: Daniel Reeder MD Provider: Guerline Holland CRNA    Anesthesia Type: general ASA Status: 2            Anesthesia Type: general    Vitals  Vitals Value Taken Time   /96 08/13/23 1647   Temp     Pulse 71 08/13/23 1649   Resp     SpO2 95 % 08/13/23 1649   Vitals shown include unvalidated device data.        Post Anesthesia Care and Evaluation    Patient location during evaluation: PACU  Patient participation: complete - patient participated  Level of consciousness: awake  Pain scale: See nurse's notes for pain score.  Pain management: adequate    Airway patency: patent  Anesthetic complications: No anesthetic complications  PONV Status: none  Cardiovascular status: acceptable  Respiratory status: acceptable and spontaneous ventilation  Hydration status: acceptable    Comments: Patient seen and examined postoperatively; vital signs stable; SpO2 greater than or equal to 90%; cardiopulmonary status stable; nausea/vomiting adequately controlled; pain adequately controlled; no apparent anesthesia complications; patient discharged from anesthesia care when discharge criteria were met

## 2023-08-13 NOTE — H&P
[facility] Observation Unit H&P    Patient Name: Ga Dye  : 1985  MRN: [id number]  Primary Care Physician: Provider, No Known  Date of admission: 2023     Patient Care Team:  Provider, No Known as PCP - General          Subjective   History Present Illness     Chief Complaint:   Chief Complaint   Patient presents with    Groin Pain     Left groin pain    History of Present Illness  Obtained from admitting physician HPI on 2023:  Patient is a 38-year-old male complaining of left groin pain for the past 24 hours.  He states that the inguinal hernia in the past that was repaired.  He states the pain is acute and severe.  Denies fever vomiting diarrhea or other complaint     23 (postobservation admission):  Patient confirms the HPI noted above including a somewhat chronic history of pain in his left groin following previous hernia repair which became significantly worse over the past day with patient noted that he tried multiple times to reduce the hernia on his own without success.  Pain reportedly began suddenly while he was at work where he does significant amounts of heavy lifting.  He denies any fever, dyspnea, changes in bowel or bladder habits, nausea or vomiting.  Patient drinks occasionally and does vape.        Review of Systems   Constitutional: Negative.   HENT: Negative.     Eyes: Negative.    Cardiovascular: Negative.    Respiratory: Negative.     Skin: Negative.    Musculoskeletal: Negative.         Groin pain   Gastrointestinal: Negative.    Genitourinary: Negative.    Neurological: Negative.    Psychiatric/Behavioral: Negative.           Personal History     Past Medical History:   Past Medical History:   Diagnosis Date    Anxiety     Depression     Hypertension     Inguinal hernia        Surgical History:      Past Surgical History:   Procedure Laterality Date    ADENOIDECTOMY      FRACTURE SURGERY      INGUINAL HERNIA REPAIR Left 2021    Procedure: INGUINAL HERNIA REPAIR  LAPAROSCOPIC;  Surgeon: Jason Champion DO;  Location: [facility] MAIN OR;  Service: General;  Laterality: Left;    LEG SURGERY      TONSILLECTOMY             Family History: family history is not on file. Otherwise pertinent FHx was reviewed and unremarkable.     Social History:  reports that he has been smoking. He does not have any smokeless tobacco history on file. He reports current alcohol use of about 5.0 standard drinks per week. He reports current drug use. Drug: Marijuana.      Medications:  Prior to Admission medications    Medication Sig Start Date End Date Taking? Authorizing Provider   cephalexin (KEFLEX) 500 MG capsule Take 1 capsule by mouth 3 (Three) Times a Day. 8/9/19   Erickson Polanco PA   ibuprofen (ADVIL,MOTRIN) 800 MG tablet Take 1 tablet by mouth Every 6 (Six) Hours As Needed for Mild Pain , Moderate Pain  or Fever for up to 30 doses. 8/9/19   Erickson Polanco PA   oxyCODONE-acetaminophen (Percocet)  MG per tablet Take 1 tablet by mouth Every 6 (Six) Hours As Needed for Moderate Pain  for up to 30 doses. 6/28/21   Jason Champion DO   sulfamethoxazole-trimethoprim (BACTRIM DS,SEPTRA DS) 800-160 MG per tablet Take 1 tablet by mouth 2 (Two) Times a Day. 8/9/19   Erickson Polanco PA       Allergies:    Allergies   Allergen Reactions    Tylenol [Acetaminophen] Mental Status Change    Tramadol Nausea And Vomiting       Objective   Objective     Vital Signs  Temp:  [97.9 øF (36.6 øC)] 97.9 øF (36.6 øC)  Heart Rate:  [] 55  Resp:  [11-18] 11  BP: (135-163)/() 135/77  SpO2:  [95 %-99 %] 96 %  on  Flow (L/min):  [3] 3;   Device (Oxygen Therapy): nasal cannula  Body mass index is 27.45 kg/mý.    Physical Exam  Vitals reviewed.   Constitutional:       General: He is not in acute distress.     Appearance: Normal appearance. He is normal weight. He is not ill-appearing, toxic-appearing or diaphoretic.   HENT:      Head: Normocephalic.      Right Ear: External ear normal.       Left Ear: External ear normal.      Nose: Nose normal.      Mouth/Throat:      Mouth: Mucous membranes are moist.   Eyes:      Extraocular Movements: Extraocular movements intact.   Cardiovascular:      Rate and Rhythm: Normal rate and regular rhythm.      Pulses: Normal pulses.      Heart sounds: Normal heart sounds.   Pulmonary:      Effort: Pulmonary effort is normal.      Breath sounds: Normal breath sounds.   Abdominal:      General: Bowel sounds are normal.      Palpations: Abdomen is soft.   Musculoskeletal:         General: Normal range of motion.      Cervical back: Normal range of motion.      Right lower leg: No edema.      Left lower leg: No edema.   Skin:     General: Skin is warm and dry.      Capillary Refill: Capillary refill takes less than 2 seconds.   Neurological:      General: No focal deficit present.      Mental Status: He is alert and oriented to person, place, and time.   Psychiatric:         Mood and Affect: Mood normal.         Behavior: Behavior normal.         Thought Content: Thought content normal.         Judgment: Judgment normal.         Results Review:  I have personally reviewed most recent cardiac tracings, lab results, and radiology images and interpretations and agree with findings, most notably: BMP, CBC and CT of abdomen and pelvis.    Results from last 7 days   Lab Units 08/13/23  1307   WBC 10*3/mm3 9.80   HEMOGLOBIN g/dL 16.4   HEMATOCRIT % 50.6   PLATELETS 10*3/mm3 273     Results from last 7 days   Lab Units 08/13/23  1307   SODIUM mmol/L 136   POTASSIUM mmol/L 4.7   CHLORIDE mmol/L 101   CO2 mmol/L 19.0*   BUN mg/dL 12   CREATININE mg/dL 0.79   GLUCOSE mg/dL 83   CALCIUM mg/dL 9.5     Estimated Creatinine Clearance: 163 mL/min (by C-G formula based on SCr of 0.79 mg/dL).  Brief Urine Lab Results       None            Microbiology Results (last 10 days)       ** No results found for the last 240 hours. **            ECG/EMG Results (most recent)       None                     CT Abdomen Pelvis Without Contrast    Result Date: 8/13/2023  1.A moderate left inguinal hernia is noted. The proximal sigmoid colon partially extends into the hernia. There is mild stranding in the soft tissues of the hernia. These findings may indicate developing entrapment. There is no bowel edema at this time. No bowel dilatation is observed. There are no definitive signs of obstruction at this time. 2.There is no bowel perforation. There is no abscess. 3.No evidence for significant nephrolithiasis. There is no evidence for obstructive uropathy. Electronically Signed: Paul Angeles  8/13/2023 2:03 PM EDT  Workstation ID: [id number]       Estimated Creatinine Clearance: 163 mL/min (by C-G formula based on SCr of 0.79 mg/dL).    Assessment & Plan   Assessment/Plan       Active Hospital Problems    Diagnosis  POA    **Incarcerated left inguinal hernia [K40.30]  Unknown      Resolved Hospital Problems   No resolved problems to display.     Left inguinal hernia  -Anion gap: 16.0 with a serum CO2 of 19.0  -CT of abdomen and pelvis shows a moderate left inguinal hernia with proximal sigmoid colon partially extending into the hernia and mild stranding in the soft tissues which may indicate developing entrapment with no bowel edema noted at present and no definite signs of obstruction.  No bowel perforation or abscess is reported nor is there any reported evidence of significant nephrolithiasis or obstructive uropathy  -Patient given etomidate and reduction attempted in the ED without success  -Patient given morphine in the ED, continue  -General surgery consulted who plan  -N.p.o.          VTE Prophylaxis -   Mechanical Order History:        Ordered        08/13/23 1355  Place Sequential Compression Device on Patient in Pre-Op  Once                          Pharmalogical Order History:       None            CODE STATUS:    There are no questions and answers to display.       This patient has been examined  wearing personal protective equipment.     I discussed the patient's findings and my recommendations with patient and nursing staff.      Signature:Electronically signed by Be Mast PA-C, 08/13/23, 3:16 PM EDT.

## 2023-08-13 NOTE — OP NOTE
Operative Report:    Patient Name:  Ga Dye  YOB: 1985    Date of Surgery:  8/13/2023     Indications: 38-year-old gentleman with a history of a left inguinal hernia repaired a couple years ago laparoscopically who came in with an incarcerated recurrent left inguinal hernia counseled about the risk and benefits of repair he understood and wished to proceed.    Pre-op Diagnosis:   Incarcerated left inguinal hernia [K40.30]       Post-Op Diagnosis Codes:     * Incarcerated left inguinal hernia [K40.30]    Procedure/CPTr Codes:      Procedure(s):  INGUINAL HERNIA REPAIR WITH MESH    Staff:  Surgeon(s):  Daniel Reeder MD    Circulator: Vidhya Louie RN  Scrub Person: Leticia Walker        Anesthesia: General    Estimated Blood Loss: minimal    Implants:    Implant Name Type Inv. Item Serial No.  Lot No. LRB No. Used Action   MESH FLUT SHT 3X6IN - [id number] Implant MESH FLUT SHT 3X6IN  [employer] [id number] Left 1 Implanted       Specimen:          Specimens       ID Source Type Tests Collected By Collected At Frozen?    A Groin, left Tissue TISSUE PATHOLOGY EXAM   Daniel Reeder MD 8/13/23 8074 No    Description: POSSIBLE LEFT INGUINAL NERVE    B Groin, left Tissue TISSUE PATHOLOGY EXAM   Daniel Reeder MD 8/13/23 1285 No    Description: LEFT INGUINAL HERNIA SAC                Findings: Recurrent indirect inguinal hernia.  Colon had reduced with general anesthesia and was not present within the hernia sac during dissection    Complications: None    Description of Procedure: Patient was taken to the operating placed on the operating table in the supine position under general anesthesia.  His left groin was prepped and draped normal sterile fashion.  Timeout was performed identifying correct patient procedure and site of operation.  I began the operation by infiltrating the skin and subcutaneous tissue in the left inguinal region with local anesthetic.  I then  made a transverse incision with 15 blade scalpel dissection through subtenons tissue down to the external oblique fascia was performed with the Bovie there was minimal bleeding.  I then opened the external oblique fascia with the Bovie.  I then freed up the fascial edges and dissected the spermatic cord and the hernia contents circumferentially placed a Penrose drain around this.  I then performed my dissection of the cremasteric fibers and freed up the hernia sac.  Vessels were identified and protected.  During the process there was a small nerve which was divided with the cautery the hernia sac was opened and there were no contents.  I then performed a high ligation of the hernia sac and sent it down as a specimen.  I then cut the mesh to size and shape secured to the pubic tubercle the conjoined tendon the shelving edge using 0 Ethibond sutures the tails were crossed to recreate the internal ring using 0 Ethibond suture without any significant tension on the vasa vessels.  The wound was irrigated with sterile water a field block was performed.  I then closed the external oblique using 2-0 Vicryl suture and closed Jonathan's fascia with 2-0 Vicryl suture the skin with interrupted 3-0 and running 4-0 Vicryl skin affix was placed on the incision.  Toller procedure well's been transferred to recovery in satisfactory condition.      Daniel Reeder MD     Date: 8/13/2023  Time: 16:42 EDT    This note was created using Dragon Voice Recognition software.

## 2023-08-13 NOTE — PLAN OF CARE
Goal Outcome Evaluation:    New admit. S/P INGUINAL HERNIA REPAIR WITH MESH - Left with Dr. Reeder

## 2023-08-14 ENCOUNTER — TELEPHONE (OUTPATIENT)
Dept: SURGERY | Facility: CLINIC | Age: 38
End: 2023-08-14
Payer: COMMERCIAL

## 2023-08-14 VITALS
TEMPERATURE: 97.7 F | WEIGHT: 202.38 LBS | BODY MASS INDEX: 27.41 KG/M2 | DIASTOLIC BLOOD PRESSURE: 63 MMHG | SYSTOLIC BLOOD PRESSURE: 128 MMHG | RESPIRATION RATE: 14 BRPM | HEIGHT: 72 IN | HEART RATE: 89 BPM | OXYGEN SATURATION: 98 %

## 2023-08-14 LAB
ANION GAP SERPL CALCULATED.3IONS-SCNC: 9 MMOL/L (ref 5–15)
BASOPHILS # BLD AUTO: 0 10*3/MM3 (ref 0–0.2)
BASOPHILS NFR BLD AUTO: 0.1 % (ref 0–1.5)
BUN SERPL-MCNC: 19 MG/DL (ref 6–20)
BUN/CREAT SERPL: 24.4 (ref 7–25)
CALCIUM SPEC-SCNC: 9.3 MG/DL (ref 8.6–10.5)
CHLORIDE SERPL-SCNC: 101 MMOL/L (ref 98–107)
CO2 SERPL-SCNC: 25 MMOL/L (ref 22–29)
CREAT SERPL-MCNC: 0.78 MG/DL (ref 0.76–1.27)
DEPRECATED RDW RBC AUTO: 43.3 FL (ref 37–54)
EGFRCR SERPLBLD CKD-EPI 2021: 116.3 ML/MIN/1.73
EOSINOPHIL # BLD AUTO: 0 10*3/MM3 (ref 0–0.4)
EOSINOPHIL NFR BLD AUTO: 0 % (ref 0.3–6.2)
ERYTHROCYTE [DISTWIDTH] IN BLOOD BY AUTOMATED COUNT: 13.9 % (ref 12.3–15.4)
GLUCOSE SERPL-MCNC: 129 MG/DL (ref 65–99)
HCT VFR BLD AUTO: 43.8 % (ref 37.5–51)
HGB BLD-MCNC: 14.4 G/DL (ref 13–17.7)
LYMPHOCYTES # BLD AUTO: 0.6 10*3/MM3 (ref 0.7–3.1)
LYMPHOCYTES NFR BLD AUTO: 6.6 % (ref 19.6–45.3)
MAGNESIUM SERPL-MCNC: 2.3 MG/DL (ref 1.6–2.6)
MCH RBC QN AUTO: 28.2 PG (ref 26.6–33)
MCHC RBC AUTO-ENTMCNC: 32.9 G/DL (ref 31.5–35.7)
MCV RBC AUTO: 85.7 FL (ref 79–97)
MONOCYTES # BLD AUTO: 0.4 10*3/MM3 (ref 0.1–0.9)
MONOCYTES NFR BLD AUTO: 4.1 % (ref 5–12)
NEUTROPHILS NFR BLD AUTO: 8.4 10*3/MM3 (ref 1.7–7)
NEUTROPHILS NFR BLD AUTO: 89.2 % (ref 42.7–76)
NRBC BLD AUTO-RTO: 0 /100 WBC (ref 0–0.2)
PLATELET # BLD AUTO: 209 10*3/MM3 (ref 140–450)
PMV BLD AUTO: 9.2 FL (ref 6–12)
POTASSIUM SERPL-SCNC: 4.5 MMOL/L (ref 3.5–5.2)
RBC # BLD AUTO: 5.11 10*6/MM3 (ref 4.14–5.8)
SODIUM SERPL-SCNC: 135 MMOL/L (ref 136–145)
WBC NRBC COR # BLD: 9.4 10*3/MM3 (ref 3.4–10.8)

## 2023-08-14 PROCEDURE — G0378 HOSPITAL OBSERVATION PER HR: HCPCS

## 2023-08-14 PROCEDURE — 85025 COMPLETE CBC W/AUTO DIFF WBC: CPT | Performed by: SURGERY

## 2023-08-14 PROCEDURE — 80048 BASIC METABOLIC PNL TOTAL CA: CPT | Performed by: SURGERY

## 2023-08-14 PROCEDURE — 25010000002 MORPHINE PER 10 MG: Performed by: SURGERY

## 2023-08-14 PROCEDURE — 83735 ASSAY OF MAGNESIUM: CPT | Performed by: SURGERY

## 2023-08-14 RX ORDER — ONDANSETRON 4 MG/1
4 TABLET, FILM COATED ORAL EVERY 6 HOURS PRN
Qty: 30 TABLET | Refills: 0 | Status: SHIPPED | OUTPATIENT
Start: 2023-08-14

## 2023-08-14 RX ORDER — HYDROCODONE BITARTRATE AND ACETAMINOPHEN 5; 325 MG/1; MG/1
1 TABLET ORAL EVERY 4 HOURS PRN
Qty: 15 TABLET | Refills: 0 | Status: SHIPPED | OUTPATIENT
Start: 2023-08-14 | End: 2023-08-16 | Stop reason: SDUPTHER

## 2023-08-14 RX ORDER — BISACODYL 5 MG/1
5 TABLET, DELAYED RELEASE ORAL DAILY PRN
Qty: 30 TABLET | Refills: 0 | Status: SHIPPED | OUTPATIENT
Start: 2023-08-14

## 2023-08-14 RX ADMIN — HYDROCODONE BITARTRATE AND ACETAMINOPHEN 1 TABLET: 5; 325 TABLET ORAL at 06:44

## 2023-08-14 RX ADMIN — MORPHINE SULFATE 2 MG: 4 INJECTION, SOLUTION INTRAMUSCULAR; INTRAVENOUS at 04:37

## 2023-08-14 RX ADMIN — MORPHINE SULFATE 2 MG: 4 INJECTION, SOLUTION INTRAMUSCULAR; INTRAVENOUS at 00:34

## 2023-08-14 NOTE — PROGRESS NOTES
General Surgery Progress Note    Name: Ga Dye ADMIT: 2023   : 1985  PCP: Provider, No Known    MRN: [id number] LOS: 0 days   AGE/SEX: 38 y.o. male  ROOM: [address] [place] [address]    Chief Complaint   Patient presents with    Groin Pain     Subjective     38 y.o. male who is status post open left inguinal hernia repair for incarcerated recurrent inguinal hernia    Overnight he is doing great.  Pain seems to be controlled.  He tolerated regular food last night including pain station.  Had a bowel movement.  Ambulatory.  Voiding spontaneously.      Objective     Scheduled Medications:   senna-docusate sodium, 2 tablet, Oral, BID  sodium chloride, 10 mL, Intravenous, Q12H        Active Infusions:       As Needed Medications:    senna-docusate sodium **AND** polyethylene glycol **AND** bisacodyl **AND** bisacodyl    Calcium Replacement - Follow Nurse / BPA Driven Protocol    HYDROcodone-acetaminophen    Magnesium Standard Dose Replacement - Follow Nurse / BPA Driven Protocol    melatonin    Morphine    ondansetron **OR** ondansetron    Phosphorus Replacement - Follow Nurse / BPA Driven Protocol    Potassium Replacement - Follow Nurse / BPA Driven Protocol    [COMPLETED] Insert Peripheral IV **AND** sodium chloride    sodium chloride    sodium chloride    Vital Signs  Vital Signs Patient Vitals for the past 24 hrs:   BP Temp Temp src Pulse Resp SpO2 Height Weight   23 0620 128/63 97.7 øF (36.5 øC) Oral 89 14 98 % -- --   23 2223 155/89 98.1 øF (36.7 øC) Oral 98 22 96 % -- --   23 1820 148/93 98.2 øF (36.8 øC) Oral 70 18 95 % -- --   23 1744 136/77 98.3 øF (36.8 øC) -- 74 15 95 % -- --   23 1730 147/80 -- -- 73 16 95 % -- --   23 1715 128/91 -- -- 73 13 96 % -- --   23 1700 141/78 -- -- 83 22 99 % -- --   23 1655 156/90 -- -- 88 11 95 % -- --   23 1650 141/88 -- -- 99 20 93 % -- --   23 1645 155/96 97.1 øF (36.2 øC) -- 101 15 97 % -- --   23 1457  "153/93 98.3 øF (36.8 øC) Tympanic 77 12 98 % -- --   08/13/23 1442 -- -- -- 77 -- 98 % -- --   08/13/23 1431 135/77 -- -- 55 -- 96 % -- --   08/13/23 1430 -- -- -- 57 -- 95 % -- --   08/13/23 1403 144/87 -- -- 65 -- 98 % -- --   08/13/23 1333 140/82 -- -- 56 -- 95 % -- --   08/13/23 1300 -- -- -- 73 -- 96 % -- --   08/13/23 1259 -- -- -- 90 -- 96 % -- --   08/13/23 1257 -- -- -- (!) 126 -- 97 % -- --   08/13/23 1256 163/96 -- -- (!) 124 -- 97 % -- --   08/13/23 1254 -- -- -- 120 -- 99 % -- --   08/13/23 1253 -- -- -- 94 -- 96 % -- --   08/13/23 1251 (!) 160/112 -- -- 91 -- 99 % -- --   08/13/23 1248 -- -- -- -- -- 97 % -- --   08/13/23 1248 -- -- -- 71 -- 96 % -- --   08/13/23 1247 154/85 -- -- 61 11 96 % -- --   08/13/23 1245 -- -- -- 76 -- 98 % -- --   08/13/23 1242 142/98 -- -- 65 -- 96 % -- --   08/13/23 1231 141/93 -- -- 61 -- 95 % -- --   08/13/23 1225 -- -- -- 69 -- 97 % -- --   08/13/23 1215 163/89 -- -- 66 -- 97 % -- --   08/13/23 1111 160/98 97.9 øF (36.6 øC) Oral 96 18 98 % -- --   08/13/23 1107 -- -- -- -- -- -- 182.9 cm (72\") 91.8 kg (202 lb 6.1 oz)       Physical Exam:  Physical Exam  Constitutional:       Appearance: Normal appearance.   HENT:      Head: Normocephalic and atraumatic.   Genitourinary:     Comments: Left inguinal incision is healing with some appropriate tenderness to palpation no erythema minimal swelling  Neurological:      Mental Status: He is alert.       Results Review:     CBC    Results from last 7 days   Lab Units 08/14/23  0349 08/13/23  1307   WBC 10*3/mm3 9.40 9.80   HEMOGLOBIN g/dL 14.4 16.4   PLATELETS 10*3/mm3 209 273     BMP   Results from last 7 days   Lab Units 08/14/23  0349 08/13/23  1307   SODIUM mmol/L 135* 136   POTASSIUM mmol/L 4.5 4.7   CHLORIDE mmol/L 101 101   CO2 mmol/L 25.0 19.0*   BUN mg/dL 19 12   CREATININE mg/dL 0.78 0.79   GLUCOSE mg/dL 129* 83   MAGNESIUM mg/dL 2.3  --        I reviewed the patient's new clinical results.    Assessment & Plan       " Incarcerated left inguinal hernia      38 y.o. male status post open left inguinal hernia repair    Seems to be doing well  Discharge instructions have been left in the chart  We will see him in about 2 weeks      This note was created using Dragon Voice Recognition software.    Daniel Reeder MD  08/14/23  09:04 EDT   Ftsg Text: The defect edges were debeveled with a #15 scalpel blade.  Given the location of the defect, shape of the defect and the proximity to free margins a full thickness skin graft was deemed most appropriate.  Using a sterile surgical marker, the primary defect shape was transferred to the donor site. The area thus outlined was incised deep to adipose tissue with a #15 scalpel blade.  The harvested graft was then trimmed of adipose tissue until only dermis and epidermis was left.  The skin margins of the secondary defect were undermined to an appropriate distance in all directions utilizing iris scissors.  The secondary defect was closed with interrupted buried subcutaneous sutures.  The skin edges were then re-apposed with running  sutures.  The skin graft was then placed in the primary defect and oriented appropriately.

## 2023-08-14 NOTE — NURSING NOTE
Checked on patient , found pt sitting on edge of bed rocking back and forth in pain. Pt stated pain 8/10. Pt last [place] at 2255 and Morphine 2 mg at 2127....miscalculated the time so medication was given earlier.     Gave the patient an ice pack to place on site to help alleviate the pain.

## 2023-08-14 NOTE — DISCHARGE SUMMARY
[facility]    Provider, No Known    CHIEF COMPLAINT:     Left Groin Toni n    HISTORY OF PRESENT ILLNESS:    HPI    Obtained from admitting physician HPI on 8/13/2023:  Patient is a 38-year-old male complaining of left groin pain for the past 24 hours.  He states that the inguinal hernia in the past that was repaired.  He states the pain is acute and severe.  Denies fever vomiting diarrhea or other complaint     08/13/23 (postobservation admission):  Patient confirms the HPI noted above including a somewhat chronic history of pain in his left groin following previous hernia repair which became significantly worse over the past day with patient noted that he tried multiple times to reduce the hernia on his own without success.  Pain reportedly began suddenly while he was at work where he does significant amounts of heavy lifting.  He denies any fever, dyspnea, changes in bowel or bladder habits, nausea or vomiting.  Patient drinks occasionally and does vape.    Past Medical History:   Diagnosis Date    Anxiety     Depression     Hypertension     Inguinal hernia      Past Surgical History:   Procedure Laterality Date    ADENOIDECTOMY      FRACTURE SURGERY      INGUINAL HERNIA REPAIR Left 6/21/2021    Procedure: INGUINAL HERNIA REPAIR LAPAROSCOPIC;  Surgeon: Jason Champion DO;  Location: [facility];  Service: General;  Laterality: Left;    LEG SURGERY      TONSILLECTOMY       History reviewed. No pertinent family history.  Social History     Tobacco Use    Smoking status: Every Day    Tobacco comments:     vapes all day-uses thc   Vaping Use    Vaping Use: Every day    Substances: THC   Substance Use Topics    Alcohol use: Yes     Alcohol/week: 5.0 standard drinks     Types: 5 Cans of beer per week    Drug use: Yes     Types: Marijuana     Medications Prior to Admission   Medication Sig Dispense Refill Last Dose    ibuprofen (ADVIL,MOTRIN) 800 MG tablet Take 1 tablet by mouth Every 6 (Six)  Hours As Needed for Mild Pain , Moderate Pain  or Fever for up to 30 doses. 30 tablet 0 Past Week     Allergies:  Tylenol [acetaminophen] and Tramadol      There is no immunization history on file for this patient.        REVIEW OF SYSTEMS:    Review of Systems   Constitutional: Negative.   HENT: Negative.     Eyes: Negative.    Cardiovascular: Negative.    Respiratory: Negative.     Endocrine: Negative.    Hematologic/Lymphatic: Negative.    Skin: Negative.    Musculoskeletal: Negative.    Gastrointestinal:  Positive for abdominal pain.   Genitourinary: Negative.    Neurological: Negative.    Psychiatric/Behavioral: Negative.       Vital Signs  Temp:  [97.1 øF (36.2 øC)-98.3 øF (36.8 øC)] 97.7 øF (36.5 øC)  Heart Rate:  [] 89  Resp:  [11-22] 14  BP: (128-163)/() 128/63          Physical Exam:  Physical Exam  Vitals and nursing note reviewed.   Constitutional:       Appearance: Normal appearance.   HENT:      Head: Normocephalic and atraumatic.      Right Ear: External ear normal.      Left Ear: External ear normal.      Nose: Nose normal.      Mouth/Throat:      Pharynx: Oropharynx is clear.   Eyes:      Extraocular Movements: Extraocular movements intact.      Conjunctiva/sclera: Conjunctivae normal.   Cardiovascular:      Rate and Rhythm: Normal rate and regular rhythm.      Pulses: Normal pulses.      Heart sounds: Normal heart sounds.   Pulmonary:      Effort: Pulmonary effort is normal.      Breath sounds: Normal breath sounds.   Abdominal:      Tenderness: There is abdominal tenderness.   Musculoskeletal:         General: Tenderness present. Normal range of motion.      Cervical back: Normal range of motion.   Skin:     General: Skin is warm.      Capillary Refill: Capillary refill takes less than 2 seconds.   Neurological:      Mental Status: He is alert and oriented to person, place, and time.   Psychiatric:         Mood and Affect: Mood normal.         Behavior: Behavior normal.         Thought  Content: Thought content normal.         Judgment: Judgment normal.       Emotional Behavior:    WNl   Debilities:   none  Results Review:    I reviewed the patient's new clinical results.  Lab Results (most recent)       Procedure Component Value Units Date/Time    Magnesium [[id number]]  (Normal) Collected: 08/14/23 0349    Specimen: Blood from Arm, Left Updated: 08/14/23 0441     Magnesium 2.3 mg/dL     Basic Metabolic Panel [[id number]]  (Abnormal) Collected: 08/14/23 0349    Specimen: Blood from Arm, Left Updated: 08/14/23 0441     Glucose 129 mg/dL      BUN 19 mg/dL      Creatinine 0.78 mg/dL      Sodium 135 mmol/L      Potassium 4.5 mmol/L      Chloride 101 mmol/L      CO2 25.0 mmol/L      Calcium 9.3 mg/dL      BUN/Creatinine Ratio 24.4     Anion Gap 9.0 mmol/L      eGFR 116.3 mL/min/1.73     Narrative:      GFR Normal >60  Chronic Kidney Disease <60  Kidney Failure <15      CBC & Differential [[id number]]  (Abnormal) Collected: 08/14/23 0349    Specimen: Blood from Arm, Left Updated: 08/14/23 0432    Narrative:      The following orders were created for panel order CBC & Differential.  Procedure                               Abnormality         Status                     ---------                               -----------         ------                     CBC Auto Differential[[id number]]        Abnormal            Final result                 Please view results for these tests on the individual orders.    CBC Auto Differential [[id number]]  (Abnormal) Collected: 08/14/23 0349    Specimen: Blood from Arm, Left Updated: 08/14/23 0432     WBC 9.40 10*3/mm3      RBC 5.11 10*6/mm3      Hemoglobin 14.4 g/dL      Comment: Result checked          Hematocrit 43.8 %      MCV 85.7 fL      MCH 28.2 pg      MCHC 32.9 g/dL      RDW 13.9 %      RDW-SD 43.3 fl      MPV 9.2 fL      Platelets 209 10*3/mm3      Neutrophil % 89.2 %      Lymphocyte % 6.6 %      Monocyte % 4.1 %      Eosinophil % 0.0 %      Basophil % 0.1 %       Neutrophils, Absolute 8.40 10*3/mm3      Lymphocytes, Absolute 0.60 10*3/mm3      Monocytes, Absolute 0.40 10*3/mm3      Eosinophils, Absolute 0.00 10*3/mm3      Basophils, Absolute 0.00 10*3/mm3      nRBC 0.0 /100 WBC     Basic Metabolic Panel [[id number]]  (Abnormal) Collected: 08/13/23 1307    Specimen: Blood from Arm, Right Updated: 08/13/23 1331     Glucose 83 mg/dL      BUN 12 mg/dL      Creatinine 0.79 mg/dL      Sodium 136 mmol/L      Potassium 4.7 mmol/L      Comment: Slight hemolysis detected by analyzer. Results may be affected.        Chloride 101 mmol/L      CO2 19.0 mmol/L      Calcium 9.5 mg/dL      BUN/Creatinine Ratio 15.2     Anion Gap 16.0 mmol/L      eGFR 115.9 mL/min/1.73     Narrative:      GFR Normal >60  Chronic Kidney Disease <60  Kidney Failure <15      CBC & Differential [[id number]]  (Normal) Collected: 08/13/23 1307    Specimen: Blood from Arm, Right Updated: 08/13/23 1315    Narrative:      The following orders were created for panel order CBC & Differential.  Procedure                               Abnormality         Status                     ---------                               -----------         ------                     CBC Auto Differential[[id number]]        Normal              Final result                 Please view results for these tests on the individual orders.    CBC Auto Differential [[id number]]  (Normal) Collected: 08/13/23 1307    Specimen: Blood from Arm, Right Updated: 08/13/23 1315     WBC 9.80 10*3/mm3      RBC 5.76 10*6/mm3      Hemoglobin 16.4 g/dL      Hematocrit 50.6 %      MCV 87.8 fL      MCH 28.5 pg      MCHC 32.5 g/dL      RDW 14.3 %      RDW-SD 44.2 fl      MPV 8.4 fL      Platelets 273 10*3/mm3      Neutrophil % 64.7 %      Lymphocyte % 26.6 %      Monocyte % 7.4 %      Eosinophil % 0.9 %      Basophil % 0.4 %      Neutrophils, Absolute 6.30 10*3/mm3      Lymphocytes, Absolute 2.60 10*3/mm3      Monocytes, Absolute 0.70 10*3/mm3      Eosinophils,  Absolute 0.10 10*3/mm3      Basophils, Absolute 0.00 10*3/mm3      nRBC 0.0 /100 WBC             Imaging Results (Most Recent)       Procedure Component Value Units Date/Time    CT Abdomen Pelvis Without Contrast [[id number]] Collected: 08/13/23 1358     Updated: 08/13/23 1405    Narrative:      CT ABDOMEN PELVIS WO CONTRAST    Date of Exam: 8/13/2023 1:47 PM EDT    Indication: Abdominal pain, acute, nonlocalized.    Comparison: None available.    Technique: Axial CT images were obtained of the abdomen and pelvis without the administration of contrast. Sagittal and coronal reconstructions were performed.  Automated exposure control and iterative reconstruction methods were used.      FINDINGS:  The lung bases are clear without evidence for focal consolidation or significant pleural effusion.    The liver, spleen, and pancreas are unremarkable without contrast. The gallbladder and bile ducts are unremarkable. The bilateral adrenal glands are symmetric and unremarkable without contrast.     No definitive nephrolithiasis is seen bilaterally. There is no hydronephrosis or hydroureter. There is no evidence for obstructive uropathy. No stones are seen in the bladder. The bilateral kidneys are otherwise unremarkable without contrast.    A moderate left inguinal hernia is noted. The proximal portion of the sigmoid colon partially extends into the hernia. There is mild stranding within the soft tissue of the hernia. The findings may indicate developing entrapment. No bowel dilatation is   seen. There is no evidence for obstruction at this time. There is no perforation. There is no free air. No abnormal fluid collections are seen. A small fat-containing right inguinal hernia is also noted.    A normal-appearing appendix is observed. There is no evidence for acute appendicitis. No significant free fluid is observed. No abnormal fluid collections are identified. No significant lymphadenopathy is seen throughout the abdomen or  pelvis. The   bladder is partially decompressed.     No acute osseous abnormalities are observed.      Impression:      1.A moderate left inguinal hernia is noted. The proximal sigmoid colon partially extends into the hernia. There is mild stranding in the soft tissues of the hernia. These findings may indicate developing entrapment. There is no bowel edema at this time.   No bowel dilatation is observed. There are no definitive signs of obstruction at this time.   2.There is no bowel perforation. There is no abscess.   3.No evidence for significant nephrolithiasis. There is no evidence for obstructive uropathy.        Electronically Signed: Paul Angeles    8/13/2023 2:03 PM EDT    Workstation ID: [id number]          reviewed    ECG/EMG Results (most recent)       None          reviewed            Microbiology Results (last 10 days)       ** No results found for the last 240 hours. **            Assessment & Plan     Incarcerated left inguinal hernia        Left inguinal hernia  -Anion gap: 16.0 with a serum CO2 of 19.0  -CT of abdomen and pelvis shows a moderate left inguinal hernia with proximal sigmoid colon partially extending into the hernia and mild stranding in the soft tissues which may indicate developing entrapment with no bowel edema noted at present and no definite signs of obstruction.  No bowel perforation or abscess is reported nor is there any reported evidence of significant nephrolithiasis or obstructive uropathy  -Patient given etomidate and reduction attempted in the ED without success  -Patient given morphine in the ED, continue  -General surgery consulted who plan  -N.p.o.       I discussed the patients findings and my recommendations with patient and family.     Discharge Diagnosis:      Incarcerated left inguinal hernia      Hospital Course  Patient is a 38 y.o. male presented with left groin pain.  Patient states he was at work and lifting heavy items when he noticed strain to left groin.   Patient has history of hernia repair the pain was acute and severe and proceeded to ED. CT of abdomen and pelvis shows a moderate left inguinal hernia with proximal sigmoid colon partially extending into the hernia and mild stranding in the soft tissues which may indicate developing entrapment with no bowel edema noted at present and no definite signs of obstruction. No bowel perforation or abscess is reported nor is there any reported evidence of significant nephrolithiasis or obstructive uropathy.    Patient evaluated by general surgery which recommended patient undergo left inguinal hernia repair.  Patient tolerated procedure well without complications.  Patient to take medication as prescribed.  Patient Surya taking narcotics.  Lifting restrictions and postop retractions educated by staff.  Patient to follow-up with Dr. Reeder in 2 weeks.  Patient with PCP in 1 week for continued care management.  Testing recommendations reviewed with patient he agreed to treatment plan.  If symptoms worsen patient call 911.    Past Medical History:     Past Medical History:   Diagnosis Date    Anxiety     Depression     Hypertension     Inguinal hernia        Past Surgical History:     Past Surgical History:   Procedure Laterality Date    ADENOIDECTOMY      FRACTURE SURGERY      INGUINAL HERNIA REPAIR Left 6/21/2021    Procedure: INGUINAL HERNIA REPAIR LAPAROSCOPIC;  Surgeon: Jason Champion DO;  Location: [facility];  Service: General;  Laterality: Left;    LEG SURGERY      TONSILLECTOMY         Social History:   Social History     Socioeconomic History    Marital status: Significant Other   Tobacco Use    Smoking status: Every Day    Tobacco comments:     vapes all day-uses thc   Vaping Use    Vaping Use: Every day    Substances: THC   Substance and Sexual Activity    Alcohol use: Yes     Alcohol/week: 5.0 standard drinks     Types: 5 Cans of beer per week    Drug use: Yes     Types: Marijuana    Sexual activity: Defer        Procedures Performed    Procedure(s):  INGUINAL HERNIA REPAIR WITH MESH  -------------------       Consults:   Consults       No orders found for last 30 day(s).            Condition on Discharge:     Stable    Discharge Disposition  Home or Self Care    Discharge Medications     Discharge Medications        New Medications        Instructions Start Date   bisacodyl 5 MG EC tablet  Commonly known as: DULCOLAX   5 mg, Oral, Daily PRN      HYDROcodone-acetaminophen 5-325 MG per tablet  Commonly known as: NORCO   1 tablet, Oral, Every 4 Hours PRN      ondansetron 4 MG tablet  Commonly known as: ZOFRAN   4 mg, Oral, Every 6 Hours PRN             Continue These Medications        Instructions Start Date   ibuprofen 800 MG tablet  Commonly known as: ADVIL,MOTRIN   800 mg, Oral, Every 6 Hours PRN               Discharge Diet:   Diet Instructions       Diet: Cardiac Diets; Healthy Heart (2-3 Na+); Regular Texture (IDDSI 7); Thin (IDDSI 0)      Discharge Diet: Cardiac Diets    Cardiac Diet: Healthy Heart (2-3 Na+)    Texture: Regular Texture (IDDSI 7)    Fluid Consistency: Thin (IDDSI 0)            Activity at Discharge:   Activity Instructions       Activity as Tolerated      Driving Restrictions      Type of Restriction: Driving    Driving Restrictions: No Driving While Taking Narcotics    Lifting Restrictions      Type of Restriction: Lifting    Lifting Restrictions: Other    Explain Lifing Restrictions: No lifting more than 15 lbs for 2 weeks. Or otherwise specified the day of surgery.    Measure Blood Pressure      Other Activity Instructions      Activity Instructions: May shower in 24 hours. Do not tub soak incision or swim for at least 2 weeks.            Follow-up Appointments  No future appointments.  Additional Instructions for the Follow-ups that You Need to Schedule       Call MD With Problems / Concerns   As directed      Call the office, [phone] with any questions or concerns following your surgery.     Order Comments: Call the office, [phone] with any questions or concerns following your surgery.         Discharge Follow-up with PCP   As directed       Currently Documented PCP:    Provider, No Known    PCP Phone Number:    None     Follow Up Details: 7-10 days        Discharge Follow-up with Specified Provider: Dr. Reeder, General Surgery. Call [phone] to schedule; 2 Weeks   As directed      To: Dr. Reeder, General Surgery. Call [phone] to schedule   Follow Up: 2 Weeks                Test Results Pending at Discharge  Pending Labs       Order Current Status    Tissue Pathology Exam In process             Risk for Readmission (LACE) Score: 1 (8/14/2023  6:00 AM)          MARIBETH Scott  08/14/23  10:25 EDT        I spent 35 minutes caring for Ga on this date of service. This time includes time spent by me in the following activities: reviewing tests, obtaining and/or reviewing a separately obtained history, performing a medically appropriate examination and/or evaluation, counseling and educating the patient/family/caregiver, ordering medications, tests, or procedures, referring and communicating with other health care professionals, documenting information in the medical record, independently interpreting results and communicating that information with the patient/family/caregiver, and care coordination.

## 2023-08-14 NOTE — DISCHARGE INSTR - APPOINTMENTS
Follow-up with Dr. Reeder general surgery.     Call [phone] to schedule within 2 weeks.     Also follow-up with PCP

## 2023-08-14 NOTE — PLAN OF CARE
Problem: Breathing Pattern Ineffective  Goal: Effective Breathing Pattern  Outcome: Ongoing, Progressing  Intervention: Promote Improved Breathing Pattern  Recent Flowsheet Documentation  Taken 8/13/2023 2000 by Debbi Vera RN  Supportive Measures: active listening utilized  Head of Bed (HOB) Positioning: HOB elevated   Goal Outcome Evaluation:

## 2023-08-15 LAB
LAB AP CASE REPORT: NORMAL
PATH REPORT.FINAL DX SPEC: NORMAL
PATH REPORT.GROSS SPEC: NORMAL

## 2023-08-15 NOTE — CASE MANAGEMENT/SOCIAL WORK
Case Management Discharge Note      Final Note: home prior to CM assessment             Transportation Services  Private: Car    Final Discharge Disposition Code: 01 - home or self-care  
Cont to monitor   Stable on RA

## 2023-08-15 NOTE — TELEPHONE ENCOUNTER
PO FU Call- spoke with pt. jose carlos 2 wk po appt. Will fu then. Knows to call with any questions or concerns.        States in a lot of pain. Wants to know what else can do. Informed the first few days are rough and to try adding ibuprofen and using an ice pack. Stated has been doing that. Will send message to Dr. Reeder.     Per Dr. Reeder will get better with time. Pt can try doing 2 tabs of pain meds. Informed pt. Stated understood.

## 2023-08-16 DIAGNOSIS — K40.30 INCARCERATED LEFT INGUINAL HERNIA: ICD-10-CM

## 2023-08-16 RX ORDER — HYDROCODONE BITARTRATE AND ACETAMINOPHEN 5; 325 MG/1; MG/1
1 TABLET ORAL EVERY 4 HOURS PRN
Qty: 15 TABLET | Refills: 0 | Status: SHIPPED | OUTPATIENT
Start: 2023-08-16 | End: 2023-08-22

## (undated) DEVICE — CVR HNDL LT SURG ACCSSRY BLU STRL

## (undated) DEVICE — DRN PENRS SIL 1/2X18IN LF

## (undated) DEVICE — ACCESS AND DISSECTOR SYSTEM: Brand: SPACEMAKER

## (undated) DEVICE — KT SURG TURNOVER 050

## (undated) DEVICE — SOLUTION,WATER,IRRIGATION,1000ML,STERILE: Brand: MEDLINE

## (undated) DEVICE — ADHS SKIN PREMIERPRO EXOFIN TOPICAL HI/VISC .5ML

## (undated) DEVICE — GLV SURG BIOGEL SENSR LTX PF SZ7.5

## (undated) DEVICE — SUT VIC 0/0 UR6 27IN DYED J603H

## (undated) DEVICE — ANTIBACTERIAL UNDYED BRAIDED (POLYGLACTIN 910), SYNTHETIC ABSORBABLE SUTURE: Brand: COATED VICRYL

## (undated) DEVICE — APPL CHLORAPREP HI/LITE 26ML ORNG

## (undated) DEVICE — LAPAROSCOPIC GAS CONDITIONING DEVICE.: Brand: INSUFLOW

## (undated) DEVICE — 3M™ IOBAN™ 2 ANTIMICROBIAL INCISE DRAPE 6650EZ: Brand: IOBAN™ 2

## (undated) DEVICE — UNDERGLV SURG BIOGEL INDICATOR LTX PF 7

## (undated) DEVICE — GLV SURG BIOGEL LTX PF 7

## (undated) DEVICE — PK MINOR LAPAROTOMY 50

## (undated) DEVICE — SUT SILK 2/0 SH 30IN K833H

## (undated) DEVICE — SLV SCD CALF HEMOFORCE DVT THERP REPROC MD

## (undated) DEVICE — GENERAL LAPAROSCOPY CDS: Brand: MEDLINE INDUSTRIES, INC.

## (undated) DEVICE — ADHS LIQ MASTISOL 2/3ML

## (undated) DEVICE — SUT ETHIB 0/0 MO6 I8IN CX45D

## (undated) DEVICE — DRSNG SURESITE WNDW 2.38X2.75

## (undated) DEVICE — SPNG GZ AVANT 6PLY 4X4IN STRL PK/2

## (undated) DEVICE — DECANTER: Brand: UNBRANDED